# Patient Record
Sex: MALE | Race: WHITE | NOT HISPANIC OR LATINO
[De-identification: names, ages, dates, MRNs, and addresses within clinical notes are randomized per-mention and may not be internally consistent; named-entity substitution may affect disease eponyms.]

---

## 2017-05-09 PROBLEM — Z00.00 ENCOUNTER FOR PREVENTIVE HEALTH EXAMINATION: Status: ACTIVE | Noted: 2017-05-09

## 2017-05-30 ENCOUNTER — FORM ENCOUNTER (OUTPATIENT)
Age: 62
End: 2017-05-30

## 2017-05-31 ENCOUNTER — OUTPATIENT (OUTPATIENT)
Dept: OUTPATIENT SERVICES | Facility: HOSPITAL | Age: 62
LOS: 1 days | End: 2017-05-31
Payer: COMMERCIAL

## 2017-05-31 ENCOUNTER — APPOINTMENT (OUTPATIENT)
Dept: SPINE | Facility: CLINIC | Age: 62
End: 2017-05-31

## 2017-05-31 VITALS
BODY MASS INDEX: 33.6 KG/M2 | SYSTOLIC BLOOD PRESSURE: 113 MMHG | HEART RATE: 67 BPM | WEIGHT: 240 LBS | HEIGHT: 71 IN | OXYGEN SATURATION: 97 % | DIASTOLIC BLOOD PRESSURE: 73 MMHG

## 2017-05-31 PROCEDURE — 72050 X-RAY EXAM NECK SPINE 4/5VWS: CPT

## 2017-05-31 PROCEDURE — 72050 X-RAY EXAM NECK SPINE 4/5VWS: CPT | Mod: 26

## 2017-06-13 ENCOUNTER — FORM ENCOUNTER (OUTPATIENT)
Age: 62
End: 2017-06-13

## 2017-06-14 ENCOUNTER — OUTPATIENT (OUTPATIENT)
Dept: OUTPATIENT SERVICES | Facility: HOSPITAL | Age: 62
LOS: 1 days | End: 2017-06-14
Payer: COMMERCIAL

## 2017-06-14 ENCOUNTER — APPOINTMENT (OUTPATIENT)
Dept: SPINE | Facility: CLINIC | Age: 62
End: 2017-06-14

## 2017-06-14 VITALS
DIASTOLIC BLOOD PRESSURE: 77 MMHG | HEART RATE: 73 BPM | WEIGHT: 240 LBS | SYSTOLIC BLOOD PRESSURE: 123 MMHG | BODY MASS INDEX: 33.6 KG/M2 | OXYGEN SATURATION: 99 % | HEIGHT: 71 IN

## 2017-06-14 PROCEDURE — 72125 CT NECK SPINE W/O DYE: CPT

## 2017-06-14 PROCEDURE — 72125 CT NECK SPINE W/O DYE: CPT | Mod: 26

## 2017-07-12 ENCOUNTER — MOBILE ON CALL (OUTPATIENT)
Age: 62
End: 2017-07-12

## 2017-08-31 VITALS
TEMPERATURE: 98 F | HEART RATE: 63 BPM | RESPIRATION RATE: 18 BRPM | HEIGHT: 70 IN | DIASTOLIC BLOOD PRESSURE: 63 MMHG | WEIGHT: 229.94 LBS | SYSTOLIC BLOOD PRESSURE: 136 MMHG | OXYGEN SATURATION: 98 %

## 2017-08-31 NOTE — PATIENT PROFILE ADULT. - PMH
Depression    HLD (hyperlipidemia)    HTN (hypertension)    Spinal cord compression  cervical Depression    DM (diabetes mellitus)    DVT (deep venous thrombosis)  left leg  HLD (hyperlipidemia)    HTN (hypertension)    MI (myocardial infarction)    Spinal cord compression  cervical

## 2017-08-31 NOTE — PATIENT PROFILE ADULT. - PSH
S/P cholecystectomy    Surgery, elective  sinus surgery  Surgery, elective  lung removal, partial  Surgery, elective  ankle arthroscopy with open repair History of knee replacement, total, left    S/P cholecystectomy    Surgery, elective  ankle arthroscopy with open repair  Surgery, elective  lung removal, partial  Surgery, elective  sinus surgery

## 2017-09-01 ENCOUNTER — INPATIENT (INPATIENT)
Facility: HOSPITAL | Age: 62
LOS: 4 days | Discharge: NURSING, REHAB OR LONG-TERM CARE | DRG: 472 | End: 2017-09-06
Attending: NEUROLOGICAL SURGERY | Admitting: NEUROLOGICAL SURGERY
Payer: COMMERCIAL

## 2017-09-01 DIAGNOSIS — Z90.49 ACQUIRED ABSENCE OF OTHER SPECIFIED PARTS OF DIGESTIVE TRACT: Chronic | ICD-10-CM

## 2017-09-01 DIAGNOSIS — Z41.9 ENCOUNTER FOR PROCEDURE FOR PURPOSES OTHER THAN REMEDYING HEALTH STATE, UNSPECIFIED: Chronic | ICD-10-CM

## 2017-09-01 DIAGNOSIS — Z90.2 ACQUIRED ABSENCE OF LUNG [PART OF]: ICD-10-CM

## 2017-09-01 DIAGNOSIS — Z96.652 PRESENCE OF LEFT ARTIFICIAL KNEE JOINT: Chronic | ICD-10-CM

## 2017-09-01 DIAGNOSIS — G95.29 OTHER CORD COMPRESSION: ICD-10-CM

## 2017-09-01 LAB
BASE EXCESS BLDA CALC-SCNC: -2.5 MMOL/L — LOW (ref -2–3)
CA-I BLDA-SCNC: 1.03 MMOL/L — LOW (ref 1.12–1.3)
COHGB MFR BLDA: 3 % — HIGH
GAS PNL BLDA: SIGNIFICANT CHANGE UP
HCO3 BLDA-SCNC: 22 MMOL/L — SIGNIFICANT CHANGE UP (ref 21–28)
HGB BLDA-MCNC: 12.7 G/DL — LOW (ref 13–17)
METHGB MFR BLDA: 0.2 % — SIGNIFICANT CHANGE UP
O2 CT VFR BLDA CALC: 17.3 ML/DL — SIGNIFICANT CHANGE UP (ref 15–23)
OXYHGB MFR BLDA: 96 % — SIGNIFICANT CHANGE UP (ref 94–100)
PCO2 BLDA: 38 MMHG — SIGNIFICANT CHANGE UP (ref 35–48)
PH BLDA: 7.39 — SIGNIFICANT CHANGE UP (ref 7.35–7.45)
PO2 BLDA: 132 MMHG — HIGH (ref 83–108)
POTASSIUM BLDA-SCNC: 4 MMOL/L — SIGNIFICANT CHANGE UP (ref 3.5–4.9)
SAO2 % BLDA: 99 % — SIGNIFICANT CHANGE UP (ref 95–100)
SODIUM BLDA-SCNC: 137 MMOL/L — LOW (ref 138–146)

## 2017-09-01 PROCEDURE — 22842 INSERT SPINE FIXATION DEVICE: CPT

## 2017-09-01 PROCEDURE — 63048 LAM FACETEC &FORAMOT EA ADDL: CPT

## 2017-09-01 PROCEDURE — 63045 LAM FACETEC & FORAMOT CRV: CPT | Mod: 80

## 2017-09-01 PROCEDURE — 22614 ARTHRD PST TQ 1NTRSPC EA ADD: CPT

## 2017-09-01 PROCEDURE — 22600 ARTHRD PST TQ 1NTRSPC CRV: CPT | Mod: 80

## 2017-09-01 PROCEDURE — 22600 ARTHRD PST TQ 1NTRSPC CRV: CPT

## 2017-09-01 PROCEDURE — 22842 INSERT SPINE FIXATION DEVICE: CPT | Mod: 80

## 2017-09-01 PROCEDURE — 22614 ARTHRD PST TQ 1NTRSPC EA ADD: CPT | Mod: 80

## 2017-09-01 PROCEDURE — 63048 LAM FACETEC &FORAMOT EA ADDL: CPT | Mod: 80

## 2017-09-01 PROCEDURE — 63045 LAM FACETEC & FORAMOT CRV: CPT

## 2017-09-01 RX ORDER — DEXTROSE 50 % IN WATER 50 %
25 SYRINGE (ML) INTRAVENOUS ONCE
Qty: 0 | Refills: 0 | Status: DISCONTINUED | OUTPATIENT
Start: 2017-09-01 | End: 2017-09-06

## 2017-09-01 RX ORDER — ATORVASTATIN CALCIUM 80 MG/1
20 TABLET, FILM COATED ORAL AT BEDTIME
Qty: 0 | Refills: 0 | Status: DISCONTINUED | OUTPATIENT
Start: 2017-09-01 | End: 2017-09-06

## 2017-09-01 RX ORDER — OXYCODONE AND ACETAMINOPHEN 5; 325 MG/1; MG/1
1 TABLET ORAL EVERY 4 HOURS
Qty: 0 | Refills: 0 | Status: DISCONTINUED | OUTPATIENT
Start: 2017-09-01 | End: 2017-09-06

## 2017-09-01 RX ORDER — SODIUM CHLORIDE 9 MG/ML
1000 INJECTION INTRAMUSCULAR; INTRAVENOUS; SUBCUTANEOUS
Qty: 0 | Refills: 0 | Status: DISCONTINUED | OUTPATIENT
Start: 2017-09-01 | End: 2017-09-02

## 2017-09-01 RX ORDER — INSULIN LISPRO 100/ML
VIAL (ML) SUBCUTANEOUS
Qty: 0 | Refills: 0 | Status: DISCONTINUED | OUTPATIENT
Start: 2017-09-01 | End: 2017-09-06

## 2017-09-01 RX ORDER — BUPIVACAINE 13.3 MG/ML
20 INJECTION, SUSPENSION, LIPOSOMAL INFILTRATION ONCE
Qty: 0 | Refills: 0 | Status: DISCONTINUED | OUTPATIENT
Start: 2017-09-01 | End: 2017-09-06

## 2017-09-01 RX ORDER — CYCLOBENZAPRINE HYDROCHLORIDE 10 MG/1
10 TABLET, FILM COATED ORAL EVERY 8 HOURS
Qty: 0 | Refills: 0 | Status: DISCONTINUED | OUTPATIENT
Start: 2017-09-01 | End: 2017-09-05

## 2017-09-01 RX ORDER — GLUCAGON INJECTION, SOLUTION 0.5 MG/.1ML
1 INJECTION, SOLUTION SUBCUTANEOUS ONCE
Qty: 0 | Refills: 0 | Status: DISCONTINUED | OUTPATIENT
Start: 2017-09-01 | End: 2017-09-06

## 2017-09-01 RX ORDER — SODIUM CHLORIDE 9 MG/ML
1000 INJECTION, SOLUTION INTRAVENOUS
Qty: 0 | Refills: 0 | Status: DISCONTINUED | OUTPATIENT
Start: 2017-09-01 | End: 2017-09-06

## 2017-09-01 RX ORDER — DEXTROSE 50 % IN WATER 50 %
1 SYRINGE (ML) INTRAVENOUS ONCE
Qty: 0 | Refills: 0 | Status: DISCONTINUED | OUTPATIENT
Start: 2017-09-01 | End: 2017-09-06

## 2017-09-01 RX ORDER — DOCUSATE SODIUM 100 MG
100 CAPSULE ORAL THREE TIMES A DAY
Qty: 0 | Refills: 0 | Status: DISCONTINUED | OUTPATIENT
Start: 2017-09-01 | End: 2017-09-06

## 2017-09-01 RX ORDER — PROPRANOLOL HCL 160 MG
20 CAPSULE, EXTENDED RELEASE 24HR ORAL DAILY
Qty: 0 | Refills: 0 | Status: DISCONTINUED | OUTPATIENT
Start: 2017-09-01 | End: 2017-09-06

## 2017-09-01 RX ORDER — INSULIN LISPRO 100/ML
VIAL (ML) SUBCUTANEOUS AT BEDTIME
Qty: 0 | Refills: 0 | Status: DISCONTINUED | OUTPATIENT
Start: 2017-09-01 | End: 2017-09-03

## 2017-09-01 RX ORDER — ACETAMINOPHEN 500 MG
650 TABLET ORAL EVERY 6 HOURS
Qty: 0 | Refills: 0 | Status: DISCONTINUED | OUTPATIENT
Start: 2017-09-01 | End: 2017-09-06

## 2017-09-01 RX ORDER — CLONAZEPAM 1 MG
3 TABLET ORAL AT BEDTIME
Qty: 0 | Refills: 0 | Status: DISCONTINUED | OUTPATIENT
Start: 2017-09-01 | End: 2017-09-06

## 2017-09-01 RX ORDER — HYDROMORPHONE HYDROCHLORIDE 2 MG/ML
1 INJECTION INTRAMUSCULAR; INTRAVENOUS; SUBCUTANEOUS
Qty: 0 | Refills: 0 | Status: DISCONTINUED | OUTPATIENT
Start: 2017-09-01 | End: 2017-09-02

## 2017-09-01 RX ORDER — RISPERIDONE 4 MG/1
3 TABLET ORAL AT BEDTIME
Qty: 0 | Refills: 0 | Status: DISCONTINUED | OUTPATIENT
Start: 2017-09-01 | End: 2017-09-06

## 2017-09-01 RX ORDER — DULOXETINE HYDROCHLORIDE 30 MG/1
60 CAPSULE, DELAYED RELEASE ORAL DAILY
Qty: 0 | Refills: 0 | Status: DISCONTINUED | OUTPATIENT
Start: 2017-09-01 | End: 2017-09-06

## 2017-09-01 RX ORDER — MAGNESIUM HYDROXIDE 400 MG/1
30 TABLET, CHEWABLE ORAL EVERY 12 HOURS
Qty: 0 | Refills: 0 | Status: DISCONTINUED | OUTPATIENT
Start: 2017-09-01 | End: 2017-09-06

## 2017-09-01 RX ORDER — ZALEPLON 10 MG
20 CAPSULE ORAL AT BEDTIME
Qty: 0 | Refills: 0 | Status: DISCONTINUED | OUTPATIENT
Start: 2017-09-01 | End: 2017-09-06

## 2017-09-01 RX ORDER — TAMSULOSIN HYDROCHLORIDE 0.4 MG/1
0.4 CAPSULE ORAL AT BEDTIME
Qty: 0 | Refills: 0 | Status: DISCONTINUED | OUTPATIENT
Start: 2017-09-01 | End: 2017-09-06

## 2017-09-01 RX ORDER — OXYCODONE AND ACETAMINOPHEN 5; 325 MG/1; MG/1
2 TABLET ORAL EVERY 6 HOURS
Qty: 0 | Refills: 0 | Status: DISCONTINUED | OUTPATIENT
Start: 2017-09-01 | End: 2017-09-06

## 2017-09-01 RX ORDER — ONDANSETRON 8 MG/1
4 TABLET, FILM COATED ORAL EVERY 6 HOURS
Qty: 0 | Refills: 0 | Status: DISCONTINUED | OUTPATIENT
Start: 2017-09-01 | End: 2017-09-06

## 2017-09-01 RX ORDER — HYDROMORPHONE HYDROCHLORIDE 2 MG/ML
1 INJECTION INTRAMUSCULAR; INTRAVENOUS; SUBCUTANEOUS
Qty: 0 | Refills: 0 | Status: DISCONTINUED | OUTPATIENT
Start: 2017-09-01 | End: 2017-09-05

## 2017-09-01 RX ORDER — CEFAZOLIN SODIUM 1 G
2000 VIAL (EA) INJECTION EVERY 8 HOURS
Qty: 0 | Refills: 0 | Status: COMPLETED | OUTPATIENT
Start: 2017-09-01 | End: 2017-09-02

## 2017-09-01 RX ORDER — DEXTROSE 50 % IN WATER 50 %
12.5 SYRINGE (ML) INTRAVENOUS ONCE
Qty: 0 | Refills: 0 | Status: DISCONTINUED | OUTPATIENT
Start: 2017-09-01 | End: 2017-09-06

## 2017-09-01 RX ORDER — DEXAMETHASONE 0.5 MG/5ML
4 ELIXIR ORAL EVERY 8 HOURS
Qty: 0 | Refills: 0 | Status: DISCONTINUED | OUTPATIENT
Start: 2017-09-01 | End: 2017-09-02

## 2017-09-01 RX ORDER — SENNA PLUS 8.6 MG/1
2 TABLET ORAL AT BEDTIME
Qty: 0 | Refills: 0 | Status: DISCONTINUED | OUTPATIENT
Start: 2017-09-01 | End: 2017-09-06

## 2017-09-01 RX ORDER — BETHANECHOL CHLORIDE 25 MG
25 TABLET ORAL
Qty: 0 | Refills: 0 | Status: DISCONTINUED | OUTPATIENT
Start: 2017-09-01 | End: 2017-09-06

## 2017-09-01 RX ADMIN — Medication 3 MILLIGRAM(S): at 22:15

## 2017-09-01 RX ADMIN — HYDROMORPHONE HYDROCHLORIDE 1 MILLIGRAM(S): 2 INJECTION INTRAMUSCULAR; INTRAVENOUS; SUBCUTANEOUS at 17:40

## 2017-09-01 RX ADMIN — Medication 1: at 18:36

## 2017-09-01 RX ADMIN — HYDROMORPHONE HYDROCHLORIDE 1 MILLIGRAM(S): 2 INJECTION INTRAMUSCULAR; INTRAVENOUS; SUBCUTANEOUS at 17:25

## 2017-09-01 RX ADMIN — Medication 100 MILLIGRAM(S): at 16:58

## 2017-09-01 RX ADMIN — SENNA PLUS 2 TABLET(S): 8.6 TABLET ORAL at 22:15

## 2017-09-01 RX ADMIN — ATORVASTATIN CALCIUM 20 MILLIGRAM(S): 80 TABLET, FILM COATED ORAL at 22:15

## 2017-09-01 RX ADMIN — RISPERIDONE 3 MILLIGRAM(S): 4 TABLET ORAL at 22:15

## 2017-09-01 RX ADMIN — OXYCODONE AND ACETAMINOPHEN 2 TABLET(S): 5; 325 TABLET ORAL at 21:50

## 2017-09-01 RX ADMIN — SODIUM CHLORIDE 80 MILLILITER(S): 9 INJECTION INTRAMUSCULAR; INTRAVENOUS; SUBCUTANEOUS at 16:49

## 2017-09-01 RX ADMIN — HYDROMORPHONE HYDROCHLORIDE 1 MILLIGRAM(S): 2 INJECTION INTRAMUSCULAR; INTRAVENOUS; SUBCUTANEOUS at 15:40

## 2017-09-01 RX ADMIN — HYDROMORPHONE HYDROCHLORIDE 1 MILLIGRAM(S): 2 INJECTION INTRAMUSCULAR; INTRAVENOUS; SUBCUTANEOUS at 14:23

## 2017-09-01 RX ADMIN — Medication 25 MILLIGRAM(S): at 18:36

## 2017-09-01 RX ADMIN — TAMSULOSIN HYDROCHLORIDE 0.4 MILLIGRAM(S): 0.4 CAPSULE ORAL at 22:14

## 2017-09-01 RX ADMIN — Medication 4 MILLIGRAM(S): at 14:23

## 2017-09-01 RX ADMIN — OXYCODONE AND ACETAMINOPHEN 2 TABLET(S): 5; 325 TABLET ORAL at 21:02

## 2017-09-01 RX ADMIN — HYDROMORPHONE HYDROCHLORIDE 1 MILLIGRAM(S): 2 INJECTION INTRAMUSCULAR; INTRAVENOUS; SUBCUTANEOUS at 23:35

## 2017-09-01 RX ADMIN — HYDROMORPHONE HYDROCHLORIDE 1 MILLIGRAM(S): 2 INJECTION INTRAMUSCULAR; INTRAVENOUS; SUBCUTANEOUS at 23:06

## 2017-09-01 RX ADMIN — Medication 4 MILLIGRAM(S): at 23:04

## 2017-09-01 RX ADMIN — Medication 100 MILLIGRAM(S): at 22:15

## 2017-09-01 NOTE — H&P PST ADULT - PSH
History of knee replacement, total, left    S/P cholecystectomy    Surgery, elective  ankle arthroscopy with open repair  Surgery, elective  lung removal, partial  Surgery, elective  sinus surgery

## 2017-09-01 NOTE — H&P PST ADULT - ASSESSMENT
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  Presents with unsteady gait and MRI showing severe spinal cord compression at C4-C5.    1)  Consent signed by patient and attending in chart  2)  Pre-op medical clearance in chart  3)  Home meds: will continue psych meds, continue HLD and HTN meds.  Will do   4)  Plan for regional admission post op  5)  Mechanical DVT prophylaxis  6)  Discussed with Dr. Dallas

## 2017-09-01 NOTE — PROGRESS NOTE ADULT - SUBJECTIVE AND OBJECTIVE BOX
Subjective: Neurosurgery POC  POD#0 C2-C7 laminectomies, C3-C6 fusion. No intra op complications, 200 mls of EBL.  Patient is on PACU still drowsy from anesthesia, easily arousable  responds proper to verbal commands.  Admits to  mild posterior neck incisional pain. Denies weakness and sensory deficit.        T(C): 36.2 (09-01-17 @ 14:37), Max: 36.2 (09-01-17 @ 14:37)  HR: 68 (09-01-17 @ 14:37) (68 - 74)  BP: 145/70 (09-01-17 @ 14:37) (117/70 - 156/83)  RR: 10 (09-01-17 @ 14:37) (7 - 11)  SpO2: 97% (09-01-17 @ 14:37) (93% - 100%)  Wt(kg): --    Exam:  A&O x3 NAD, PERRL  Neck: posterior dressing is dry and clean. no edema, no hematoma  No tracheodeviation.  Lung: clear to auscultation  EXT: No focal motor deficit, 5/5 x 4  No sensory deficit to touch.     Wound: dry and clean as above  Hemovac with minimal bloody drainage    Imaging: none    Assessment/Plan:62 male post C2-C7 laminectomies, 3-C6 fusion, doing well  Plan: Pain control -  PCA when patient is more awake - Dr. Fuentes to follow  PT/OT for ADSL  monitor hemovac output  Dr. Alvarez consult for medical issus.  D/W Celena Dobbs.

## 2017-09-01 NOTE — BRIEF OPERATIVE NOTE - PROCEDURE
Cervical laminectomy with spinal fusion  09/01/2017  Laminectomy C2-C3, C4-C5, C5-C6, C6-C7  FUSION C3-C4, C4-C5, C5-C6  Active  ABEDI2

## 2017-09-01 NOTE — H&P PST ADULT - ADDITIONAL PE
"The patient appears to have a slow mentation. He has a rather masked faces. He does not  show much emotion. He speaks in a very slow manner. Memory does appear to be  impaired. Apparently, he was not able to state his age correctly. His gait is also extremely  slow, a bit unsteady. He tends to scissor. Individual muscle group testing in the upper and lower extremities does not disclose any focal weakness. He does appear to left leg circumvert a bit more than the right. There are no long track signs. "

## 2017-09-01 NOTE — H&P PST ADULT - HISTORY OF PRESENT ILLNESS
This is a 61 y/o male who was seen twice by Dr. Dallas in the past 6 months.  The following is taken from his initial office visit:    " he has had a history of an  injury approximately 20 years ago when he fell from a ladder. He has been disabled. At that time, he had a head injury, which caused significant personality changes and behavioral disturbance, and he has been on disability ever since. Recently, however, he has been having a new constellation of speech disturbance and ataxia and memory loss.  He had an MRI of his brain and the cervical spine. The MRI of the brain does not show  hydrocephalus per say. The MRI does show diffuse white matter changes bilaterally in the periventricular region, however. The MRI of the cervical spine does show significant compression at the C4-5 level. For some  reason, the actual transaxial views at that level are not particularly clear but there is no questionthere is significant spinal compression and stenosis at that level"    The patient was brought back in June 2017 for review of CT scan, which showed large bony mass with left spinal cord compression.  The case was reviewed in our weekly spine conference and he was brought in electively for decompression and posterior fusion.

## 2017-09-01 NOTE — H&P PST ADULT - PMH
Depression    DM (diabetes mellitus)    DVT (deep venous thrombosis)  left leg  HLD (hyperlipidemia)    HTN (hypertension)    MI (myocardial infarction)    Spinal cord compression  cervical

## 2017-09-02 ENCOUNTER — TRANSCRIPTION ENCOUNTER (OUTPATIENT)
Age: 62
End: 2017-09-02

## 2017-09-02 DIAGNOSIS — E11.9 TYPE 2 DIABETES MELLITUS WITHOUT COMPLICATIONS: ICD-10-CM

## 2017-09-02 DIAGNOSIS — G95.20 UNSPECIFIED CORD COMPRESSION: ICD-10-CM

## 2017-09-02 DIAGNOSIS — I10 ESSENTIAL (PRIMARY) HYPERTENSION: ICD-10-CM

## 2017-09-02 DIAGNOSIS — E78.5 HYPERLIPIDEMIA, UNSPECIFIED: ICD-10-CM

## 2017-09-02 DIAGNOSIS — I82.409 ACUTE EMBOLISM AND THROMBOSIS OF UNSPECIFIED DEEP VEINS OF UNSPECIFIED LOWER EXTREMITY: ICD-10-CM

## 2017-09-02 LAB
ANION GAP SERPL CALC-SCNC: 7 MMOL/L — SIGNIFICANT CHANGE UP (ref 5–17)
BASOPHILS NFR BLD AUTO: 0 % — SIGNIFICANT CHANGE UP (ref 0–2)
BUN SERPL-MCNC: 15 MG/DL — SIGNIFICANT CHANGE UP (ref 7–23)
CALCIUM SERPL-MCNC: 8.2 MG/DL — LOW (ref 8.4–10.5)
CHLORIDE SERPL-SCNC: 99 MMOL/L — SIGNIFICANT CHANGE UP (ref 96–108)
CO2 SERPL-SCNC: 28 MMOL/L — SIGNIFICANT CHANGE UP (ref 22–31)
CREAT SERPL-MCNC: 1 MG/DL — SIGNIFICANT CHANGE UP (ref 0.5–1.3)
EOSINOPHIL NFR BLD AUTO: 0 % — SIGNIFICANT CHANGE UP (ref 0–6)
EXTRA BLUE TOP TUBE: SIGNIFICANT CHANGE UP
GLUCOSE SERPL-MCNC: 170 MG/DL — HIGH (ref 70–99)
HBA1C BLD-MCNC: 7.1 % — HIGH (ref 4–5.6)
HCT VFR BLD CALC: 38.8 % — LOW (ref 39–50)
HGB BLD-MCNC: 13.3 G/DL — SIGNIFICANT CHANGE UP (ref 13–17)
LYMPHOCYTES # BLD AUTO: 4.6 % — LOW (ref 13–44)
MCHC RBC-ENTMCNC: 30.9 PG — SIGNIFICANT CHANGE UP (ref 27–34)
MCHC RBC-ENTMCNC: 34.3 G/DL — SIGNIFICANT CHANGE UP (ref 32–36)
MCV RBC AUTO: 90.2 FL — SIGNIFICANT CHANGE UP (ref 80–100)
MONOCYTES NFR BLD AUTO: 2.4 % — SIGNIFICANT CHANGE UP (ref 2–14)
NEUTROPHILS NFR BLD AUTO: 93 % — HIGH (ref 43–77)
PLATELET # BLD AUTO: 232 K/UL — SIGNIFICANT CHANGE UP (ref 150–400)
POTASSIUM SERPL-MCNC: 4.7 MMOL/L — SIGNIFICANT CHANGE UP (ref 3.5–5.3)
POTASSIUM SERPL-SCNC: 4.7 MMOL/L — SIGNIFICANT CHANGE UP (ref 3.5–5.3)
RBC # BLD: 4.3 M/UL — SIGNIFICANT CHANGE UP (ref 4.2–5.8)
RBC # FLD: 12.6 % — SIGNIFICANT CHANGE UP (ref 10.3–16.9)
SODIUM SERPL-SCNC: 134 MMOL/L — LOW (ref 135–145)
WBC # BLD: 18.3 K/UL — HIGH (ref 3.8–10.5)
WBC # FLD AUTO: 18.3 K/UL — HIGH (ref 3.8–10.5)

## 2017-09-02 PROCEDURE — 99223 1ST HOSP IP/OBS HIGH 75: CPT | Mod: GC

## 2017-09-02 RX ORDER — PANTOPRAZOLE SODIUM 20 MG/1
40 TABLET, DELAYED RELEASE ORAL
Qty: 0 | Refills: 0 | Status: DISCONTINUED | OUTPATIENT
Start: 2017-09-02 | End: 2017-09-06

## 2017-09-02 RX ORDER — ENOXAPARIN SODIUM 100 MG/ML
40 INJECTION SUBCUTANEOUS AT BEDTIME
Qty: 0 | Refills: 0 | Status: DISCONTINUED | OUTPATIENT
Start: 2017-09-02 | End: 2017-09-06

## 2017-09-02 RX ORDER — DEXAMETHASONE 0.5 MG/5ML
ELIXIR ORAL
Qty: 0 | Refills: 0 | Status: COMPLETED | OUTPATIENT
Start: 2017-09-02 | End: 2017-09-04

## 2017-09-02 RX ORDER — DEXAMETHASONE 0.5 MG/5ML
4 ELIXIR ORAL EVERY 6 HOURS
Qty: 0 | Refills: 0 | Status: COMPLETED | OUTPATIENT
Start: 2017-09-02 | End: 2017-09-02

## 2017-09-02 RX ORDER — DEXAMETHASONE 0.5 MG/5ML
2 ELIXIR ORAL EVERY 12 HOURS
Qty: 0 | Refills: 0 | Status: COMPLETED | OUTPATIENT
Start: 2017-09-04 | End: 2017-09-04

## 2017-09-02 RX ORDER — DEXAMETHASONE 0.5 MG/5ML
2 ELIXIR ORAL EVERY 6 HOURS
Qty: 0 | Refills: 0 | Status: COMPLETED | OUTPATIENT
Start: 2017-09-03 | End: 2017-09-03

## 2017-09-02 RX ADMIN — PANTOPRAZOLE SODIUM 40 MILLIGRAM(S): 20 TABLET, DELAYED RELEASE ORAL at 11:56

## 2017-09-02 RX ADMIN — Medication 20 MILLIGRAM(S): at 06:13

## 2017-09-02 RX ADMIN — Medication 4 MILLIGRAM(S): at 06:13

## 2017-09-02 RX ADMIN — Medication 2 MILLIGRAM(S): at 23:09

## 2017-09-02 RX ADMIN — ATORVASTATIN CALCIUM 20 MILLIGRAM(S): 80 TABLET, FILM COATED ORAL at 21:48

## 2017-09-02 RX ADMIN — Medication 25 MILLIGRAM(S): at 07:00

## 2017-09-02 RX ADMIN — Medication 3 MILLIGRAM(S): at 21:49

## 2017-09-02 RX ADMIN — RISPERIDONE 3 MILLIGRAM(S): 4 TABLET ORAL at 21:48

## 2017-09-02 RX ADMIN — OXYCODONE AND ACETAMINOPHEN 2 TABLET(S): 5; 325 TABLET ORAL at 23:40

## 2017-09-02 RX ADMIN — SODIUM CHLORIDE 80 MILLILITER(S): 9 INJECTION INTRAMUSCULAR; INTRAVENOUS; SUBCUTANEOUS at 06:14

## 2017-09-02 RX ADMIN — Medication 100 MILLIGRAM(S): at 06:13

## 2017-09-02 RX ADMIN — Medication 100 MILLIGRAM(S): at 14:42

## 2017-09-02 RX ADMIN — OXYCODONE AND ACETAMINOPHEN 2 TABLET(S): 5; 325 TABLET ORAL at 15:42

## 2017-09-02 RX ADMIN — OXYCODONE AND ACETAMINOPHEN 2 TABLET(S): 5; 325 TABLET ORAL at 23:09

## 2017-09-02 RX ADMIN — OXYCODONE AND ACETAMINOPHEN 2 TABLET(S): 5; 325 TABLET ORAL at 14:42

## 2017-09-02 RX ADMIN — Medication 2: at 11:57

## 2017-09-02 RX ADMIN — OXYCODONE AND ACETAMINOPHEN 2 TABLET(S): 5; 325 TABLET ORAL at 07:00

## 2017-09-02 RX ADMIN — TAMSULOSIN HYDROCHLORIDE 0.4 MILLIGRAM(S): 0.4 CAPSULE ORAL at 21:48

## 2017-09-02 RX ADMIN — Medication 30 MILLIGRAM(S): at 11:57

## 2017-09-02 RX ADMIN — Medication 25 MILLIGRAM(S): at 18:47

## 2017-09-02 RX ADMIN — OXYCODONE AND ACETAMINOPHEN 2 TABLET(S): 5; 325 TABLET ORAL at 06:13

## 2017-09-02 RX ADMIN — DULOXETINE HYDROCHLORIDE 60 MILLIGRAM(S): 30 CAPSULE, DELAYED RELEASE ORAL at 11:56

## 2017-09-02 RX ADMIN — Medication 4 MILLIGRAM(S): at 14:42

## 2017-09-02 RX ADMIN — Medication 100 MILLIGRAM(S): at 00:47

## 2017-09-02 RX ADMIN — ENOXAPARIN SODIUM 40 MILLIGRAM(S): 100 INJECTION SUBCUTANEOUS at 21:49

## 2017-09-02 RX ADMIN — Medication 1: at 16:53

## 2017-09-02 NOTE — PHYSICAL THERAPY INITIAL EVALUATION ADULT - ADDITIONAL COMMENTS
Patient lives in a private house in the [place] with 6-7 steps (with handrail) to enter. Patient also with one flight of ~12 steps (with handrail) to reach second floor where bedrooms are. Has a basement but does not need to access it. Prior to admission, patient was independent for all functional mobility and used a cane for ambulation. Endorses 3 falls in past few months, one in which he had syncopal episode, one in which his leg gave out, and one in which he tripped and fell.

## 2017-09-02 NOTE — PHYSICAL THERAPY INITIAL EVALUATION ADULT - PLANNED THERAPY INTERVENTIONS, PT EVAL
balance training/transfer training/gait training/bed mobility training/postural re-education/strengthening

## 2017-09-02 NOTE — PHYSICAL THERAPY INITIAL EVALUATION ADULT - CRITERIA FOR SKILLED THERAPEUTIC INTERVENTIONS
rehab potential/anticipated discharge recommendation/impairments found/functional limitations in following categories/predicted duration of therapy intervention/therapy frequency/risk reduction/prevention

## 2017-09-02 NOTE — CONSULT NOTE ADULT - PROBLEM SELECTOR RECOMMENDATION 9
patient had a history of DVT after his TKR.  He is high risk for recurrence and will continue current regimen and will follow on venous doppler prior dc

## 2017-09-02 NOTE — PROGRESS NOTE ADULT - SUBJECTIVE AND OBJECTIVE BOX
HPI:  This is a 61 y/o male who was seen twice by Dr. Dallas in the past 6 months.  The following is taken from his initial office visit:    " he has had a history of an  injury approximately 20 years ago when he fell from a ladder. He has been disabled. At that time, he had a head injury, which caused significant personality changes and behavioral disturbance, and he has been on disability ever since. Recently, however, he has been having a new constellation of speech disturbance and ataxia and memory loss.  He had an MRI of his brain and the cervical spine. The MRI of the brain does not show  hydrocephalus per say. The MRI does show diffuse white matter changes bilaterally in the periventricular region, however. The MRI of the cervical spine does show significant compression at the C4-5 level. For some  reason, the actual transaxial views at that level are not particularly clear but there is no questionthere is significant spinal compression and stenosis at that level"    The patient was brought back in June 2017 for review of CT scan, which showed large bony mass with left spinal cord compression.  The case was reviewed in our weekly spine conference and he was brought in electively for decompression and posterior fusion. (01 Sep 2017 09:39)    Hospital course:  POD#1: Hemovac output 135cc overnight. Neurologically stable/intact. Pain well controlled with PO/IV PRN management. Tolerating PO diet. Pending PT/OT evaluation.    Vital Signs Last 24 Hrs  T(C): 36 (02 Sep 2017 05:50), Max: 36.7 (01 Sep 2017 15:40)  T(F): 96.8 (02 Sep 2017 05:50), Max: 98 (01 Sep 2017 15:40)  HR: 101 (02 Sep 2017 05:50) (68 - 101)  BP: 137/74 (02 Sep 2017 05:50) (117/70 - 156/83)  BP(mean): 104 (01 Sep 2017 14:37) (88 - 111)  RR: 16 (02 Sep 2017 05:50) (7 - 19)  SpO2: 95% (02 Sep 2017 05:50) (93% - 100%)    I&O's Summary    01 Sep 2017 07:01  -  02 Sep 2017 07:00  --------------------------------------------------------  IN: 1620 mL / OUT: 4545 mL / NET: -2925 mL        PHYSICAL EXAM:  Gen: NAD, AAOx3  HEENT: PERRL. EOMI.  Neck: Posterior incision C/D/I w/ dressing. +HMV.  Lungs: Clear b/l  Heart: S1, S2. NSR.  Abd: Soft, NT/ND. +BS  Exts: Pulses 2+ throughout  Neuro: CNs II-XII intact. 5/5 str x4 extremities. Sensation to LT intact. Following commands. Normoreflexic w/o Clonus/Hoffmans. Gait not assessed.    TUBES/LINES:  [] White  [] Lumbar Drain  [] Wound Drains  [x] Others - Hemovac      DIET:  [] NPO  [x] Mechanical  [] Tube feeds    LABS:                        13.3   18.3  )-----------( 232      ( 02 Sep 2017 06:01 )             38.8     09-02    134<L>  |  99  |  15  ----------------------------<  170<H>  4.7   |  28  |  1.00    Ca    8.2<L>      02 Sep 2017 06:01              CAPILLARY BLOOD GLUCOSE  246 (01 Sep 2017 21:30)  165 (01 Sep 2017 17:28)  139 (01 Sep 2017 12:50)  156 (01 Sep 2017 10:44)          Drug Levels: [] N/A    CSF Analysis: [] N/A      Allergies    No Known Allergies    Intolerances      MEDICATIONS:  Antibiotics:    Neuro:  clonazePAM Tablet 3 milliGRAM(s) Oral at bedtime  DULoxetine 60 milliGRAM(s) Oral daily  risperiDONE   Tablet 3 milliGRAM(s) Oral at bedtime  busPIRone 30 milliGRAM(s) Oral daily  zaleplon 20 milliGRAM(s) Oral at bedtime PRN  acetaminophen   Tablet 650 milliGRAM(s) Oral every 6 hours PRN  acetaminophen   Tablet. 650 milliGRAM(s) Oral every 6 hours PRN  oxyCODONE    5 mG/acetaminophen 325 mG 1 Tablet(s) Oral every 4 hours PRN  oxyCODONE    5 mG/acetaminophen 325 mG 2 Tablet(s) Oral every 6 hours PRN  HYDROmorphone  Injectable 1 milliGRAM(s) IV Push every 3 hours PRN  cyclobenzaprine 10 milliGRAM(s) Oral every 8 hours PRN  ondansetron Injectable 4 milliGRAM(s) IV Push every 6 hours PRN    Anticoagulation:    OTHER:  BUpivacaine liposome 1.3% Injectable (no eMAR) 20 milliLiter(s) Local Injection once  tamsulosin 0.4 milliGRAM(s) Oral at bedtime  propranolol 20 milliGRAM(s) Oral daily  atorvastatin 20 milliGRAM(s) Oral at bedtime  bethanechol 25 milliGRAM(s) Oral two times a day  docusate sodium 100 milliGRAM(s) Oral three times a day  magnesium hydroxide Suspension 30 milliLiter(s) Oral every 12 hours PRN  senna 2 Tablet(s) Oral at bedtime  insulin lispro (HumaLOG) corrective regimen sliding scale   SubCutaneous three times a day before meals  insulin lispro (HumaLOG) corrective regimen sliding scale   SubCutaneous at bedtime  dextrose Gel 1 Dose(s) Oral once PRN  dextrose 50% Injectable 12.5 Gram(s) IV Push once  dextrose 50% Injectable 25 Gram(s) IV Push once  dextrose 50% Injectable 25 Gram(s) IV Push once  glucagon  Injectable 1 milliGRAM(s) IntraMuscular once PRN  dexamethasone  Injectable 4 milliGRAM(s) IV Push every 8 hours    IVF:  sodium chloride 0.9%. 1000 milliLiter(s) IV Continuous <Continuous>  dextrose 5%. 1000 milliLiter(s) IV Continuous <Continuous>          ASSESSMENT:  62y Male with PMH of HTN, HLD, DM II, BPH, Depression now s/p Posterior cervical C2-C7 laminectomy, C3-C6 hardware fusion POD#1.      PLAN:  NEURO: Decadron taper,  Continue home anti-depression meds,  Pain meds PRN, Dr. Fuentes from pain mgmt following.  Continue hemovac to self suction    CARDIOVASCULAR: Normotensive Bp goals  AM labs reviewed, repeat labs tomorrow.    PULMONARY: Incentive spirometry, satting well on RA    RENAL: White, will consider TOV when OOB    GI: PO diet, PPI, stool softeners    HEME: SCDs, SQL to start tonight, will d/w Dr. Dallas    ID: Afebrile    ENDO: ISS    DISPOSITION: PT/OT evaluation,  Will d/w Dr. Dallas

## 2017-09-02 NOTE — CONSULT NOTE ADULT - SUBJECTIVE AND OBJECTIVE BOX
Patient is a 62y old  Male who presents with a chief complaint of Gait disturbance (01 Sep 2017 09:39)      HPI:  This is a 63 y/o male who was seen twice by Dr. Dallas in the past 6 months.  The following is taken from his initial office visit:    " he has had a history of an  injury approximately 20 years ago when he fell from a ladder. He has been disabled. At that time, he had a head injury, which caused significant personality changes and behavioral disturbance, and he has been on disability ever since. Recently, however, he has been having a new constellation of speech disturbance and ataxia and memory loss.  He had an MRI of his brain and the cervical spine. The MRI of the brain does not show  hydrocephalus per say. The MRI does show diffuse white matter changes bilaterally in the periventricular region, however. The MRI of the cervical spine does show significant compression at the C4-5 level. For some  reason, the actual transaxial views at that level are not particularly clear but there is no questionthere is significant spinal compression and stenosis at that level"    The patient was brought back in June 2017 for review of CT scan, which showed large bony mass with left spinal cord compression.  The case was reviewed in our weekly spine conference and he was brought in electively for decompression and posterior fusion. (01 Sep 2017 09:39)      PAST MEDICAL & SURGICAL HISTORY:  DVT (deep venous thrombosis): left leg  MI (myocardial infarction)  DM (diabetes mellitus)  HTN (hypertension)  HLD (hyperlipidemia)  Depression  Spinal cord compression: cervical  History of knee replacement, total, left  Surgery, elective: sinus surgery  Surgery, elective: ankle arthroscopy with open repair  Surgery, elective: lung removal, partial  S/P cholecystectomy      FAMILY HISTORY:      SOCIAL HISTORY:  Smoking Status: [ ] Current, [ ] Former, [ ] Never  Pack Years:    MEDICATIONS:  Pulmonary:    Antimicrobials:    Anticoagulants:  enoxaparin Injectable 40 milliGRAM(s) SubCutaneous at bedtime    Onc:    GI/:  bethanechol 25 milliGRAM(s) Oral two times a day  docusate sodium 100 milliGRAM(s) Oral three times a day  magnesium hydroxide Suspension 30 milliLiter(s) Oral every 12 hours PRN  senna 2 Tablet(s) Oral at bedtime  pantoprazole    Tablet 40 milliGRAM(s) Oral before breakfast    Endocrine:  atorvastatin 20 milliGRAM(s) Oral at bedtime  insulin lispro (HumaLOG) corrective regimen sliding scale   SubCutaneous three times a day before meals  insulin lispro (HumaLOG) corrective regimen sliding scale   SubCutaneous at bedtime  dextrose Gel 1 Dose(s) Oral once PRN  dextrose 50% Injectable 12.5 Gram(s) IV Push once  dextrose 50% Injectable 25 Gram(s) IV Push once  dextrose 50% Injectable 25 Gram(s) IV Push once  glucagon  Injectable 1 milliGRAM(s) IntraMuscular once PRN  dexamethasone     Tablet   Oral   dexamethasone     Tablet 4 milliGRAM(s) Oral every 6 hours    Cardiac:  tamsulosin 0.4 milliGRAM(s) Oral at bedtime  propranolol 20 milliGRAM(s) Oral daily    Other Medications:  BUpivacaine liposome 1.3% Injectable (no eMAR) 20 milliLiter(s) Local Injection once  clonazePAM Tablet 3 milliGRAM(s) Oral at bedtime  DULoxetine 60 milliGRAM(s) Oral daily  risperiDONE   Tablet 3 milliGRAM(s) Oral at bedtime  busPIRone 30 milliGRAM(s) Oral daily  zaleplon 20 milliGRAM(s) Oral at bedtime PRN  acetaminophen   Tablet 650 milliGRAM(s) Oral every 6 hours PRN  acetaminophen   Tablet. 650 milliGRAM(s) Oral every 6 hours PRN  oxyCODONE    5 mG/acetaminophen 325 mG 1 Tablet(s) Oral every 4 hours PRN  oxyCODONE    5 mG/acetaminophen 325 mG 2 Tablet(s) Oral every 6 hours PRN  HYDROmorphone  Injectable 1 milliGRAM(s) IV Push every 3 hours PRN  cyclobenzaprine 10 milliGRAM(s) Oral every 8 hours PRN  ondansetron Injectable 4 milliGRAM(s) IV Push every 6 hours PRN  dextrose 5%. 1000 milliLiter(s) IV Continuous <Continuous>      Allergies    No Known Allergies    Intolerances        Vital Signs Last 24 Hrs  T(C): 36 (02 Sep 2017 09:12), Max: 36.7 (01 Sep 2017 15:40)  T(F): 96.8 (02 Sep 2017 09:12), Max: 98 (01 Sep 2017 15:40)  HR: 100 (02 Sep 2017 09:12) (68 - 101)  BP: 116/57 (02 Sep 2017 09:12) (116/57 - 156/83)  BP(mean): 104 (01 Sep 2017 14:37) (88 - 111)  RR: 16 (02 Sep 2017 09:12) (7 - 19)  SpO2: 96% (02 Sep 2017 09:12) (93% - 100%)    09-01 @ 07:01 - 09-02 @ 07:00  --------------------------------------------------------  IN: 1620 mL / OUT: 4545 mL / NET: -2925 mL    09-02 @ 07:01 - 09-02 @ 11:50  --------------------------------------------------------  IN: 350 mL / OUT: 30 mL / NET: 320 mL          LABS:  ABG - ( 01 Sep 2017 10:42 )  pH: 7.39  /  pCO2: 38    /  pO2: 132   / HCO3: 22    / Base Excess: -2.5  /  SaO2: x                   CBC Full  -  ( 02 Sep 2017 06:01 )  WBC Count : 18.3 K/uL  Hemoglobin : 13.3 g/dL  Hematocrit : 38.8 %  Platelet Count - Automated : 232 K/uL  Mean Cell Volume : 90.2 fL  Mean Cell Hemoglobin : 30.9 pg  Mean Cell Hemoglobin Concentration : 34.3 g/dL  Auto Neutrophil # : x  Auto Lymphocyte # : x  Auto Monocyte # : x  Auto Eosinophil # : x  Auto Basophil # : x  Auto Neutrophil % : 93.0 %  Auto Lymphocyte % : 4.6 %  Auto Monocyte % : 2.4 %  Auto Eosinophil % : 0.0 %  Auto Basophil % : 0.0 %    09-02    134<L>  |  99  |  15  ----------------------------<  170<H>  4.7   |  28  |  1.00    Ca    8.2<L>      02 Sep 2017 06:01                          RADIOLOGY & ADDITIONAL STUDIES (The following images were personally reviewed):

## 2017-09-02 NOTE — PHYSICAL THERAPY INITIAL EVALUATION ADULT - DIAGNOSIS, PT EVAL
Practice Pattern 4F: Impaired joint mobility, motor function, muscle performance and reflex integrity associated with spinal disorders

## 2017-09-02 NOTE — PHYSICAL THERAPY INITIAL EVALUATION ADULT - PERTINENT HX OF CURRENT PROBLEM, REHAB EVAL
Patient is a 62 year old M who presents with complaints of chronic neck pain since an old injury ~20 years ago in which he fell off a ladder. MRI showed compression and stenosis at C4-5. Presents today for laminectomy C2-C3, C4-C5, C5-C6, C6-C7 and fusion  C3-C4, C4-C5, C5-C6

## 2017-09-02 NOTE — PHYSICAL THERAPY INITIAL EVALUATION ADULT - PATIENT/FAMILY/SIGNIFICANT OTHER GOALS STATEMENT, PT EVAL
Patient is willing and motivated to participate in physical therapy and would like to get up and walk

## 2017-09-02 NOTE — PHYSICAL THERAPY INITIAL EVALUATION ADULT - MANUAL MUSCLE TESTING RESULTS, REHAB EVAL
Strength grossly at least 3/5 based on functional assessment of bed mobility, transfers and ambulation

## 2017-09-02 NOTE — PHYSICAL THERAPY INITIAL EVALUATION ADULT - GENERAL OBSERVATIONS, REHAB EVAL
Received supine in bed with +hep lock, on room air, +SCDs, +perez catheter, in no apparent distress. Patient endorses 7/10 posterior neck pain at rest

## 2017-09-03 LAB
ANION GAP SERPL CALC-SCNC: 12 MMOL/L — SIGNIFICANT CHANGE UP (ref 5–17)
BUN SERPL-MCNC: 18 MG/DL — SIGNIFICANT CHANGE UP (ref 7–23)
CALCIUM SERPL-MCNC: 8.9 MG/DL — SIGNIFICANT CHANGE UP (ref 8.4–10.5)
CHLORIDE SERPL-SCNC: 98 MMOL/L — SIGNIFICANT CHANGE UP (ref 96–108)
CO2 SERPL-SCNC: 28 MMOL/L — SIGNIFICANT CHANGE UP (ref 22–31)
CREAT SERPL-MCNC: 1.1 MG/DL — SIGNIFICANT CHANGE UP (ref 0.5–1.3)
GLUCOSE SERPL-MCNC: 170 MG/DL — HIGH (ref 70–99)
HCT VFR BLD CALC: 37.6 % — LOW (ref 39–50)
HGB BLD-MCNC: 12.9 G/DL — LOW (ref 13–17)
MCHC RBC-ENTMCNC: 31 PG — SIGNIFICANT CHANGE UP (ref 27–34)
MCHC RBC-ENTMCNC: 34.3 G/DL — SIGNIFICANT CHANGE UP (ref 32–36)
MCV RBC AUTO: 90.4 FL — SIGNIFICANT CHANGE UP (ref 80–100)
PLATELET # BLD AUTO: 259 K/UL — SIGNIFICANT CHANGE UP (ref 150–400)
POTASSIUM SERPL-MCNC: 4.1 MMOL/L — SIGNIFICANT CHANGE UP (ref 3.5–5.3)
POTASSIUM SERPL-SCNC: 4.1 MMOL/L — SIGNIFICANT CHANGE UP (ref 3.5–5.3)
RBC # BLD: 4.16 M/UL — LOW (ref 4.2–5.8)
RBC # FLD: 12.9 % — SIGNIFICANT CHANGE UP (ref 10.3–16.9)
SODIUM SERPL-SCNC: 138 MMOL/L — SIGNIFICANT CHANGE UP (ref 135–145)
WBC # BLD: 20.1 K/UL — HIGH (ref 3.8–10.5)
WBC # FLD AUTO: 20.1 K/UL — HIGH (ref 3.8–10.5)

## 2017-09-03 RX ORDER — METFORMIN HYDROCHLORIDE 850 MG/1
1000 TABLET ORAL
Qty: 0 | Refills: 0 | Status: DISCONTINUED | OUTPATIENT
Start: 2017-09-03 | End: 2017-09-06

## 2017-09-03 RX ORDER — PANTOPRAZOLE SODIUM 20 MG/1
1 TABLET, DELAYED RELEASE ORAL
Qty: 0 | Refills: 0 | COMMUNITY
Start: 2017-09-03

## 2017-09-03 RX ORDER — DOCUSATE SODIUM 100 MG
1 CAPSULE ORAL
Qty: 42 | Refills: 0 | OUTPATIENT
Start: 2017-09-03 | End: 2017-09-17

## 2017-09-03 RX ORDER — DEXAMETHASONE 0.5 MG/5ML
1 ELIXIR ORAL
Qty: 2 | Refills: 0 | OUTPATIENT
Start: 2017-09-03 | End: 2017-09-04

## 2017-09-03 RX ORDER — DOCUSATE SODIUM 100 MG
1 CAPSULE ORAL
Qty: 0 | Refills: 0 | COMMUNITY
Start: 2017-09-03

## 2017-09-03 RX ADMIN — OXYCODONE AND ACETAMINOPHEN 2 TABLET(S): 5; 325 TABLET ORAL at 09:55

## 2017-09-03 RX ADMIN — OXYCODONE AND ACETAMINOPHEN 2 TABLET(S): 5; 325 TABLET ORAL at 18:23

## 2017-09-03 RX ADMIN — Medication 2 MILLIGRAM(S): at 17:30

## 2017-09-03 RX ADMIN — Medication 100 MILLIGRAM(S): at 13:31

## 2017-09-03 RX ADMIN — Medication 3 MILLIGRAM(S): at 21:05

## 2017-09-03 RX ADMIN — Medication 1: at 08:44

## 2017-09-03 RX ADMIN — METFORMIN HYDROCHLORIDE 1000 MILLIGRAM(S): 850 TABLET ORAL at 17:29

## 2017-09-03 RX ADMIN — Medication 2 MILLIGRAM(S): at 05:22

## 2017-09-03 RX ADMIN — Medication 100 MILLIGRAM(S): at 05:21

## 2017-09-03 RX ADMIN — Medication 2: at 11:58

## 2017-09-03 RX ADMIN — Medication 25 MILLIGRAM(S): at 06:25

## 2017-09-03 RX ADMIN — TAMSULOSIN HYDROCHLORIDE 0.4 MILLIGRAM(S): 0.4 CAPSULE ORAL at 21:05

## 2017-09-03 RX ADMIN — DULOXETINE HYDROCHLORIDE 60 MILLIGRAM(S): 30 CAPSULE, DELAYED RELEASE ORAL at 11:55

## 2017-09-03 RX ADMIN — PANTOPRAZOLE SODIUM 40 MILLIGRAM(S): 20 TABLET, DELAYED RELEASE ORAL at 06:21

## 2017-09-03 RX ADMIN — ATORVASTATIN CALCIUM 20 MILLIGRAM(S): 80 TABLET, FILM COATED ORAL at 21:05

## 2017-09-03 RX ADMIN — OXYCODONE AND ACETAMINOPHEN 2 TABLET(S): 5; 325 TABLET ORAL at 08:55

## 2017-09-03 RX ADMIN — OXYCODONE AND ACETAMINOPHEN 2 TABLET(S): 5; 325 TABLET ORAL at 17:27

## 2017-09-03 RX ADMIN — Medication 2 MILLIGRAM(S): at 11:55

## 2017-09-03 RX ADMIN — RISPERIDONE 3 MILLIGRAM(S): 4 TABLET ORAL at 21:04

## 2017-09-03 RX ADMIN — Medication 30 MILLIGRAM(S): at 11:55

## 2017-09-03 RX ADMIN — METFORMIN HYDROCHLORIDE 1000 MILLIGRAM(S): 850 TABLET ORAL at 08:44

## 2017-09-03 RX ADMIN — Medication 100 MILLIGRAM(S): at 21:05

## 2017-09-03 RX ADMIN — Medication 25 MILLIGRAM(S): at 17:31

## 2017-09-03 RX ADMIN — SENNA PLUS 2 TABLET(S): 8.6 TABLET ORAL at 21:05

## 2017-09-03 RX ADMIN — Medication 1: at 17:28

## 2017-09-03 RX ADMIN — OXYCODONE AND ACETAMINOPHEN 2 TABLET(S): 5; 325 TABLET ORAL at 23:30

## 2017-09-03 RX ADMIN — Medication 20 MILLIGRAM(S): at 05:21

## 2017-09-03 RX ADMIN — ENOXAPARIN SODIUM 40 MILLIGRAM(S): 100 INJECTION SUBCUTANEOUS at 21:04

## 2017-09-03 NOTE — DISCHARGE NOTE ADULT - PLAN OF CARE
follow up with Dr. Dallas, call to schedule an appt [phone] After leaving the hospital, please follow these instructions:  • Keep your wound clean and dry.  Watch your incision for signs of infection such as: Redness,Swelling and tenderness and Drainage.  You may shower briefly, unless you told not to by your doctor.  Cover your entire incision with a waterproof bandage to make sure it does not get wet. If it gets wet, dry it off.  No tub baths or swimming for two weeks.  Do not strain neck  Take pain medicine as prescribed.  You may find it helpful to take it for morning stiffness or for soreness when trying to  sleep.  Pain medication can cause constipation. Eating food with fiber such as fruits and  vegetables, and drinking liquids may help prevent constipation.  If you are required to wear a cervical collar, please wear it as instructed.  Call you doctor immediately if you have:  Any new numbness, tingling, or weakness in your arms and legs.  • Pain that is getting worse, or not going away after taking pain medicine.  • Fever of 101° F or more.

## 2017-09-03 NOTE — DISCHARGE NOTE ADULT - NS AS ACTIVITY OBS
Driving allowed/Walking-Outdoors allowed/No Heavy lifting/straining/Walking-Indoors allowed/Stairs allowed/Do not make important decisions/Do not drive or operate machinery/Showering allowed

## 2017-09-03 NOTE — PROGRESS NOTE ADULT - SUBJECTIVE AND OBJECTIVE BOX
Pt was examiend at the bedside, no acute events overnight, passed trial of void, pt denies sob, cp, n/v    POD#1: Hemovac output 135cc overnight. Neurologically stable/intact. Pain well controlled with PO/IV PRN management. Tolerating PO diet. Pending PT/OT evaluation.  POD#2 Hemovac output 90cc overnight.  Passed trial of void. PT eval=Home with Outpatient PT, pain is well controlled, Metformin 100oBID ( Home Med) restarted    ICU Vital Signs Last 24 Hrs  T(C): 36.1 (03 Sep 2017 05:15), Max: 36.1 (02 Sep 2017 13:39)  T(F): 96.9 (03 Sep 2017 05:15), Max: 97 (02 Sep 2017 13:39)  HR: 71 (03 Sep 2017 05:15) (71 - 100)  BP: 159/78 (03 Sep 2017 05:15) (116/57 - 159/78)  BP(mean): --  ABP: --  ABP(mean): --  RR: 16 (03 Sep 2017 05:15) (15 - 16)  SpO2: 98% (03 Sep 2017 05:15) (94% - 98%)        Exam:  AA&Ox3, NAD, coherent speech  CNs II-XII grossly intact  motor 5/5 x 4 extr,  sensation to LT grossly intact,  Back: + Hemovac, + dressing       Plan:  DC Hemovac today  PT eval =Home, with outpatient PT  SQL for DVT prophylaxis  Pain meds PRN, Percocet  Taper Decadron  SCDs, IS, Bowel Regimen, OOB  Restart Metformin 1000BID ( Home Med)  D/w Dr. Dallas

## 2017-09-03 NOTE — DISCHARGE NOTE ADULT - HOSPITAL COURSE
Pt underwent an elective surgery by Dr. Dallas 9/1/2017 C2-C7 Lami, C3-C6 fusion with instr with no intra-op or post- op complications.  PT eval = Home with outpatient PT, trial of void passed, tolerates oral diet, Pain is well controlled, pt is cleared for the discharge to home Pt underwent an elective surgery by Dr. Dallas 9/1/2017 C2-C7 Lami, C3-C6 fusion with instr with no intra-op or post- op complications.  PT eval = Home with outpatient PT, trial of void passed, tolerates oral diet, Pain is well controlled, pt is cleared for the discharge K[facility] ____________ Pt underwent an elective surgery by Dr. Dallas 9/1/2017 C2-C7 Lami, C3-C6 fusion with instr with no intra-op or post- op complications.  PT eval = Home with outpatient PT, trial of void passed, tolerates oral diet, Pain is well controlled, pt is cleared for discharge. Pt is discharged to [facility] today.

## 2017-09-03 NOTE — DISCHARGE NOTE ADULT - CARE PLAN
Principal Discharge DX:	Spinal cord compression  Goal:	follow up with Dr. Dallas, call to schedule an appt [phone]  Instructions for follow-up, activity and diet:	After leaving the hospital, please follow these instructions:  • Keep your wound clean and dry.  Watch your incision for signs of infection such as: Redness,Swelling and tenderness and Drainage.  You may shower briefly, unless you told not to by your doctor.  Cover your entire incision with a waterproof bandage to make sure it does not get wet. If it gets wet, dry it off.  No tub baths or swimming for two weeks.  Do not strain neck  Take pain medicine as prescribed.  You may find it helpful to take it for morning stiffness or for soreness when trying to  sleep.  Pain medication can cause constipation. Eating food with fiber such as fruits and  vegetables, and drinking liquids may help prevent constipation.  If you are required to wear a cervical collar, please wear it as instructed.  Call you doctor immediately if you have:  Any new numbness, tingling, or weakness in your arms and legs.  • Pain that is getting worse, or not going away after taking pain medicine.  • Fever of 101° F or more. Principal Discharge DX:	Spinal cord compression  Goal:	follow up with Dr. Dallas, call to schedule an appt [phone]  Instructions for follow-up, activity and diet:	After leaving the hospital, please follow these instructions:  • Keep your wound clean and dry.  Watch your incision for signs of infection such as: Redness,Swelling and tenderness and Drainage.  You may shower briefly, unless you told not to by your doctor.  Cover your entire incision with a waterproof bandage to make sure it does not get wet. If it gets wet, dry it off.  No tub baths or swimming for two weeks.  Do not strain neck  Take pain medicine as prescribed.  You may find it helpful to take it for morning stiffness or for soreness when trying to  sleep.  Pain medication can cause constipation. Eating food with fiber such as fruits and  vegetables, and drinking liquids may help prevent constipation.  If you are required to wear a cervical collar, please wear it as instructed.  Call you doctor immediately if you have:  Any new numbness, tingling, or weakness in your arms and legs.  • Pain that is getting worse, or not going away after taking pain medicine.  • Fever of 101° F or more. Principal Discharge DX:	Spinal cord compression  Goal:	follow up with Dr. Dallas, call to schedule an appt [phone]  Instructions for follow-up, activity and diet:	After leaving the hospital, please follow these instructions:  • Keep your wound clean and dry.  Watch your incision for signs of infection such as: Redness,Swelling and tenderness and Drainage.  You may shower briefly, unless you told not to by your doctor.  Cover your entire incision with a waterproof bandage to make sure it does not get wet. If it gets wet, dry it off.  No tub baths or swimming for two weeks.  Do not strain neck  Take pain medicine as prescribed.  You may find it helpful to take it for morning stiffness or for soreness when trying to  sleep.  Pain medication can cause constipation. Eating food with fiber such as fruits and  vegetables, and drinking liquids may help prevent constipation.  If you are required to wear a cervical collar, please wear it as instructed.  Call you doctor immediately if you have:  Any new numbness, tingling, or weakness in your arms and legs.  • Pain that is getting worse, or not going away after taking pain medicine.  • Fever of 101° F or more.

## 2017-09-03 NOTE — DISCHARGE NOTE ADULT - MEDICATION SUMMARY - MEDICATIONS TO TAKE
I will START or STAY ON the medications listed below when I get home from the hospital:    acetaminophen 325 mg oral tablet  -- 2 tab(s) by mouth every 6 hours, As needed, For Temp greater than 38 C (100.4 F)  -- Indication: For Fever    acetaminophen 325 mg oral tablet  -- 2 tab(s) by mouth every 6 hours, As needed, Mild Pain (1 - 3)  -- Indication: For MIld Pain    magnesium hydroxide 8% oral suspension  -- 30 milliliter(s) by mouth every 12 hours, As needed, Constipation  -- Indication: For Constipation    tamsulosin 0.4 mg oral capsule  -- 1 cap(s) by mouth once a day  -- Indication: For BPH    propranolol 20 mg oral tablet  --  by mouth once a day  -- Indication: For Hypertension    enoxaparin  -- 40 milligram(s) subcutaneous once a day (at bedtime)  -- Indication: For DVT prophylaxis    clonazePAM  -- 3 milligram(s) by mouth once a day (at bedtime)  -- Indication: For Anxiety    DULoxetine 60 mg oral delayed release capsule  -- 1 cap(s) by mouth once a day  -- Indication: For Depression    metFORMIN 1000 mg oral tablet  -- 1 tab(s) by mouth 2 times a day  -- Indication: For Diabetes    ondansetron 2 mg/mL injectable solution  --  injectable   -- Indication: For Nausea    Lipitor 20 mg oral tablet  -- 1 tab(s) by mouth once a day  -- Indication: For Hyperlipidemia    risperiDONE 3 mg oral tablet  --  by mouth once a day (at bedtime)  -- Indication: For Antipsychotic    busPIRone 30 mg oral tablet  --  by mouth once a day  -- Indication: For Antipsychotic    zaleplon  -- 20 milligram(s) by mouth once a day (at bedtime)  -- Indication: For Insomnia    senna oral tablet  -- 2 tab(s) by mouth once a day (at bedtime)  -- Indication: For Constipation    bethanechol 25 mg oral tablet  -- 1 tab(s) by mouth 2 times a day  -- Indication: For Urinary retention    simethicone 80 mg oral tablet, chewable  -- 1 tab(s) by mouth 3 times a day, As needed, Gas  -- Indication: For Gas, PRN    cyclobenzaprine 5 mg oral tablet  -- 1 tab(s) by mouth 4 times a day, As needed, Shoulder stiffness, muscle spasm  -- Indication: For Muscle Spasm    pantoprazole 40 mg oral delayed release tablet  -- 1 tab(s) by mouth once a day (before a meal)  -- Indication: For GERD

## 2017-09-03 NOTE — DISCHARGE NOTE ADULT - CARE PROVIDER_API CALL
Wes Dallas), Neurological Surgery  [address], [place] [facility]  Phone: [phone]  Fax: [phone]

## 2017-09-03 NOTE — DISCHARGE NOTE ADULT - PATIENT PORTAL LINK FT
“You can access the Follow[employer] Patient Portal, offered by [facility], by registering with the following website: [email or web address]”

## 2017-09-04 LAB
HCT VFR BLD CALC: 38 % — LOW (ref 39–50)
HGB BLD-MCNC: 13.2 G/DL — SIGNIFICANT CHANGE UP (ref 13–17)
MCHC RBC-ENTMCNC: 31.1 PG — SIGNIFICANT CHANGE UP (ref 27–34)
MCHC RBC-ENTMCNC: 34.7 G/DL — SIGNIFICANT CHANGE UP (ref 32–36)
MCV RBC AUTO: 89.6 FL — SIGNIFICANT CHANGE UP (ref 80–100)
PLATELET # BLD AUTO: 244 K/UL — SIGNIFICANT CHANGE UP (ref 150–400)
RBC # BLD: 4.24 M/UL — SIGNIFICANT CHANGE UP (ref 4.2–5.8)
RBC # FLD: 12.9 % — SIGNIFICANT CHANGE UP (ref 10.3–16.9)
WBC # BLD: 18.2 K/UL — HIGH (ref 3.8–10.5)
WBC # FLD AUTO: 18.2 K/UL — HIGH (ref 3.8–10.5)

## 2017-09-04 RX ADMIN — OXYCODONE AND ACETAMINOPHEN 1 TABLET(S): 5; 325 TABLET ORAL at 21:58

## 2017-09-04 RX ADMIN — Medication 100 MILLIGRAM(S): at 14:48

## 2017-09-04 RX ADMIN — ENOXAPARIN SODIUM 40 MILLIGRAM(S): 100 INJECTION SUBCUTANEOUS at 21:58

## 2017-09-04 RX ADMIN — Medication 2 MILLIGRAM(S): at 05:43

## 2017-09-04 RX ADMIN — Medication 100 MILLIGRAM(S): at 05:43

## 2017-09-04 RX ADMIN — Medication 25 MILLIGRAM(S): at 18:21

## 2017-09-04 RX ADMIN — DULOXETINE HYDROCHLORIDE 60 MILLIGRAM(S): 30 CAPSULE, DELAYED RELEASE ORAL at 11:25

## 2017-09-04 RX ADMIN — METFORMIN HYDROCHLORIDE 1000 MILLIGRAM(S): 850 TABLET ORAL at 07:14

## 2017-09-04 RX ADMIN — TAMSULOSIN HYDROCHLORIDE 0.4 MILLIGRAM(S): 0.4 CAPSULE ORAL at 21:57

## 2017-09-04 RX ADMIN — Medication 25 MILLIGRAM(S): at 06:23

## 2017-09-04 RX ADMIN — Medication 20 MILLIGRAM(S): at 05:44

## 2017-09-04 RX ADMIN — OXYCODONE AND ACETAMINOPHEN 2 TABLET(S): 5; 325 TABLET ORAL at 17:00

## 2017-09-04 RX ADMIN — OXYCODONE AND ACETAMINOPHEN 2 TABLET(S): 5; 325 TABLET ORAL at 00:30

## 2017-09-04 RX ADMIN — Medication 1: at 07:14

## 2017-09-04 RX ADMIN — OXYCODONE AND ACETAMINOPHEN 2 TABLET(S): 5; 325 TABLET ORAL at 10:00

## 2017-09-04 RX ADMIN — Medication 3 MILLIGRAM(S): at 21:57

## 2017-09-04 RX ADMIN — ATORVASTATIN CALCIUM 20 MILLIGRAM(S): 80 TABLET, FILM COATED ORAL at 21:57

## 2017-09-04 RX ADMIN — PANTOPRAZOLE SODIUM 40 MILLIGRAM(S): 20 TABLET, DELAYED RELEASE ORAL at 07:14

## 2017-09-04 RX ADMIN — Medication 2: at 17:45

## 2017-09-04 RX ADMIN — Medication 30 MILLIGRAM(S): at 11:27

## 2017-09-04 RX ADMIN — OXYCODONE AND ACETAMINOPHEN 2 TABLET(S): 5; 325 TABLET ORAL at 09:06

## 2017-09-04 RX ADMIN — SENNA PLUS 2 TABLET(S): 8.6 TABLET ORAL at 21:57

## 2017-09-04 RX ADMIN — RISPERIDONE 3 MILLIGRAM(S): 4 TABLET ORAL at 21:57

## 2017-09-04 RX ADMIN — METFORMIN HYDROCHLORIDE 1000 MILLIGRAM(S): 850 TABLET ORAL at 17:45

## 2017-09-04 RX ADMIN — OXYCODONE AND ACETAMINOPHEN 1 TABLET(S): 5; 325 TABLET ORAL at 22:36

## 2017-09-04 RX ADMIN — OXYCODONE AND ACETAMINOPHEN 2 TABLET(S): 5; 325 TABLET ORAL at 16:20

## 2017-09-04 RX ADMIN — Medication 2 MILLIGRAM(S): at 17:43

## 2017-09-04 RX ADMIN — Medication 100 MILLIGRAM(S): at 21:58

## 2017-09-04 NOTE — PROGRESS NOTE ADULT - SUBJECTIVE AND OBJECTIVE BOX
Pt was examined at the bedside, no acute events were reported overnight, passed pain is well controlled, pt denies sob, cp, n/v    POD#1: Hemovac output 135cc overnight. Neurologically stable/intact. Pain well controlled with PO/IV PRN management. Tolerating PO diet. Pending PT/OT evaluation.  POD#2 Hemovac output 90cc overnight ( Remove today).  Passed trial of void. PT eval=Home with Outpatient PT, pain is well controlled, Metformin 100oBID ( Home Med) restarted  POD#3 Pain is well controlled, family is requesting Rehab Placement, wound dressing is off    ICU Vital Signs Last 24 Hrs  T(C): 36.1 (04 Sep 2017 05:34), Max: 36.8 (03 Sep 2017 20:40)  T(F): 97 (04 Sep 2017 05:34), Max: 98.3 (03 Sep 2017 20:40)  HR: 95 (04 Sep 2017 05:34) (69 - 95)  BP: 133/78 (04 Sep 2017 05:34) (124/60 - 173/76)  BP(mean): --  ABP: --  ABP(mean): --  RR: 16 (04 Sep 2017 05:34) (16 - 17)  SpO2: 94% (04 Sep 2017 05:34) (94% - 100%)      Exam:  AA&Ox3, NAD, coherent speech  CNs II-XII grossly intact  motor 5/5 x 4 extr, no drift  sensation to LT grossly intact,  Back: incision c/d/i, no palpable collections    Plan:  PT eval =Home, with outpatient PT  SQL for DVT prophylaxis  Pain meds PRN, Percocet  Taper Decadron, today is the last day 3/3  SCDs, IS, Bowel Regimen, OOB  Cont Metformin 1000BID ( Home Med)  D/w Dr. Dallas

## 2017-09-05 PROCEDURE — 99233 SBSQ HOSP IP/OBS HIGH 50: CPT | Mod: GC

## 2017-09-05 RX ORDER — CYCLOBENZAPRINE HYDROCHLORIDE 10 MG/1
5 TABLET, FILM COATED ORAL THREE TIMES A DAY
Qty: 0 | Refills: 0 | Status: DISCONTINUED | OUTPATIENT
Start: 2017-09-05 | End: 2017-09-06

## 2017-09-05 RX ORDER — MULTIVIT WITH MIN/MFOLATE/K2 340-15/3 G
250 POWDER (GRAM) ORAL DAILY
Qty: 0 | Refills: 0 | Status: COMPLETED | OUTPATIENT
Start: 2017-09-05 | End: 2017-09-05

## 2017-09-05 RX ORDER — POLYETHYLENE GLYCOL 3350 17 G/17G
17 POWDER, FOR SOLUTION ORAL
Qty: 0 | Refills: 0 | Status: DISCONTINUED | OUTPATIENT
Start: 2017-09-05 | End: 2017-09-06

## 2017-09-05 RX ADMIN — RISPERIDONE 3 MILLIGRAM(S): 4 TABLET ORAL at 21:49

## 2017-09-05 RX ADMIN — PANTOPRAZOLE SODIUM 40 MILLIGRAM(S): 20 TABLET, DELAYED RELEASE ORAL at 06:02

## 2017-09-05 RX ADMIN — OXYCODONE AND ACETAMINOPHEN 2 TABLET(S): 5; 325 TABLET ORAL at 08:48

## 2017-09-05 RX ADMIN — Medication 20 MILLIGRAM(S): at 05:19

## 2017-09-05 RX ADMIN — Medication 250 MILLILITER(S): at 19:00

## 2017-09-05 RX ADMIN — CYCLOBENZAPRINE HYDROCHLORIDE 5 MILLIGRAM(S): 10 TABLET, FILM COATED ORAL at 13:08

## 2017-09-05 RX ADMIN — Medication 1: at 07:18

## 2017-09-05 RX ADMIN — Medication 25 MILLIGRAM(S): at 18:49

## 2017-09-05 RX ADMIN — Medication 30 MILLIGRAM(S): at 11:00

## 2017-09-05 RX ADMIN — DULOXETINE HYDROCHLORIDE 60 MILLIGRAM(S): 30 CAPSULE, DELAYED RELEASE ORAL at 11:00

## 2017-09-05 RX ADMIN — Medication 100 MILLIGRAM(S): at 05:19

## 2017-09-05 RX ADMIN — ATORVASTATIN CALCIUM 20 MILLIGRAM(S): 80 TABLET, FILM COATED ORAL at 21:50

## 2017-09-05 RX ADMIN — METFORMIN HYDROCHLORIDE 1000 MILLIGRAM(S): 850 TABLET ORAL at 08:33

## 2017-09-05 RX ADMIN — POLYETHYLENE GLYCOL 3350 17 GRAM(S): 17 POWDER, FOR SOLUTION ORAL at 08:50

## 2017-09-05 RX ADMIN — Medication 100 MILLIGRAM(S): at 13:08

## 2017-09-05 RX ADMIN — ENOXAPARIN SODIUM 40 MILLIGRAM(S): 100 INJECTION SUBCUTANEOUS at 21:49

## 2017-09-05 RX ADMIN — Medication 25 MILLIGRAM(S): at 05:20

## 2017-09-05 RX ADMIN — OXYCODONE AND ACETAMINOPHEN 2 TABLET(S): 5; 325 TABLET ORAL at 09:48

## 2017-09-05 RX ADMIN — TAMSULOSIN HYDROCHLORIDE 0.4 MILLIGRAM(S): 0.4 CAPSULE ORAL at 21:50

## 2017-09-05 RX ADMIN — CYCLOBENZAPRINE HYDROCHLORIDE 5 MILLIGRAM(S): 10 TABLET, FILM COATED ORAL at 20:43

## 2017-09-05 RX ADMIN — Medication 3 MILLIGRAM(S): at 21:49

## 2017-09-05 RX ADMIN — METFORMIN HYDROCHLORIDE 1000 MILLIGRAM(S): 850 TABLET ORAL at 18:26

## 2017-09-05 NOTE — OCCUPATIONAL THERAPY INITIAL EVALUATION ADULT - RANGE OF MOTION EXAMINATION, UPPER EXTREMITY
bilateral UE Active ROM was WFL  (within functional limits)/bilateral UE Passive ROM was WFL  (within functional limits)/with c/o subjective +stiffness bilateral shoulders

## 2017-09-05 NOTE — PROGRESS NOTE ADULT - SUBJECTIVE AND OBJECTIVE BOX
HPI:  This is a 61 y/o male who was seen twice by Dr. Dallas in the past 6 months.  The following is taken from his initial office visit:    " he has had a history of an  injury approximately 20 years ago when he fell from a ladder. He has been disabled. At that time, he had a head injury, which caused significant personality changes and behavioral disturbance, and he has been on disability ever since. Recently, however, he has been having a new constellation of speech disturbance and ataxia and memory loss.  He had an MRI of his brain and the cervical spine. The MRI of the brain does not show  hydrocephalus per say. The MRI does show diffuse white matter changes bilaterally in the periventricular region, however. The MRI of the cervical spine does show significant compression at the C4-5 level. For some  reason, the actual transaxial views at that level are not particularly clear but there is no questionthere is significant spinal compression and stenosis at that level"    The patient was brought back in June 2017 for review of CT scan, which showed large bony mass with left spinal cord compression.  The case was reviewed in our weekly spine conference and he was brought in electively for decompression and posterior fusion. (01 Sep 2017 09:39)    OVERNIGHT EVENTS:  Vital Signs Last 24 Hrs  T(C): 35.8 (05 Sep 2017 05:06), Max: 36.4 (04 Sep 2017 09:03)  T(F): 96.4 (05 Sep 2017 05:06), Max: 97.6 (04 Sep 2017 09:03)  HR: 68 (05 Sep 2017 05:06) (66 - 77)  BP: 126/73 (05 Sep 2017 05:06) (115/66 - 147/78)  BP(mean): --  RR: 16 (05 Sep 2017 05:06) (15 - 17)  SpO2: 97% (05 Sep 2017 05:06) (96% - 98%)    I&O's Summary    04 Sep 2017 07:01  -  05 Sep 2017 07:00  --------------------------------------------------------  IN: 240 mL / OUT: 2275 mL / NET: -2035 mL        PHYSICAL EXAM:  Neurological:A&)X3 Cranial nerves intact  MAO  Cevical lami incision CDI    Cardiovascular:RRR  Respiratory:Lungs CTAB  Gastrointestinal:+BS  Genitourinary:Voiding without difficulty  Extremities:warm and dry  Incision/Wound: lami incision      DIET: Regular    [LABS:                        13.2   18.2  )-----------( 244      ( 04 Sep 2017 11:19 )             38.0       CAPILLARY BLOOD GLUCOSE  125 (04 Sep 2017 22:20)  213 (04 Sep 2017 17:15)  149 (04 Sep 2017 11:48)      Drug Levels: [] N/A    CSF Analysis: [] N/A      Allergies    No Known Allergies    Intolerances      MEDICATIONS:  Antibiotics:    Neuro:  clonazePAM Tablet 3 milliGRAM(s) Oral at bedtime  DULoxetine 60 milliGRAM(s) Oral daily  risperiDONE   Tablet 3 milliGRAM(s) Oral at bedtime  busPIRone 30 milliGRAM(s) Oral daily  zaleplon 20 milliGRAM(s) Oral at bedtime PRN  acetaminophen   Tablet 650 milliGRAM(s) Oral every 6 hours PRN  acetaminophen   Tablet. 650 milliGRAM(s) Oral every 6 hours PRN  oxyCODONE    5 mG/acetaminophen 325 mG 1 Tablet(s) Oral every 4 hours PRN  oxyCODONE    5 mG/acetaminophen 325 mG 2 Tablet(s) Oral every 6 hours PRN  cyclobenzaprine 10 milliGRAM(s) Oral every 8 hours PRN  ondansetron Injectable 4 milliGRAM(s) IV Push every 6 hours PRN    Anticoagulation:  enoxaparin Injectable 40 milliGRAM(s) SubCutaneous at bedtime    OTHER:  BUpivacaine liposome 1.3% Injectable (no eMAR) 20 milliLiter(s) Local Injection once  tamsulosin 0.4 milliGRAM(s) Oral at bedtime  propranolol 20 milliGRAM(s) Oral daily  atorvastatin 20 milliGRAM(s) Oral at bedtime  bethanechol 25 milliGRAM(s) Oral two times a day  docusate sodium 100 milliGRAM(s) Oral three times a day  magnesium hydroxide Suspension 30 milliLiter(s) Oral every 12 hours PRN  senna 2 Tablet(s) Oral at bedtime  insulin lispro (HumaLOG) corrective regimen sliding scale   SubCutaneous three times a day before meals  dextrose Gel 1 Dose(s) Oral once PRN  dextrose 50% Injectable 12.5 Gram(s) IV Push once  dextrose 50% Injectable 25 Gram(s) IV Push once  dextrose 50% Injectable 25 Gram(s) IV Push once  glucagon  Injectable 1 milliGRAM(s) IntraMuscular once PRN  pantoprazole    Tablet 40 milliGRAM(s) Oral before breakfast  metFORMIN 1000 milliGRAM(s) Oral two times a day with meals    IVF:  dextrose 5%. 1000 milliLiter(s) IV Continuous <Continuous>      ASSESSMENT:    62y Male with PMH of HTN, HLD, DM II, BPH, Depression now s/p Posterior cervical C2-C7 laminectomy, C3-C6 hardware fusion     PLAN:    NEURO:    Monitor neuro status  OT/PT  Pain management  Bowel regime  Continue current medical regime    Dispo: Discussed with attending

## 2017-09-05 NOTE — OCCUPATIONAL THERAPY INITIAL EVALUATION ADULT - PERTINENT HX OF CURRENT PROBLEM, REHAB EVAL
63 y/o male with history of an injury approximately 20 years ago when he fell from a ladder. He has been disabled. At that time, he had a head injury, which caused significant personality changes and behavioral disturbance, and he has been on disability ever since. Recently, however, he has been having a new constellation of speech disturbance and ataxia and memory loss.

## 2017-09-05 NOTE — OCCUPATIONAL THERAPY INITIAL EVALUATION ADULT - DIAGNOSIS, OT EVAL
Impaired functional mobility and Activities of Daily Living 2/2 impaired standing balance, ataxia X 4 extremities, slight cognitive deficits

## 2017-09-05 NOTE — PROGRESS NOTE ADULT - SUBJECTIVE AND OBJECTIVE BOX
Interval Events:Reviewed  Patient seen and examined at bedside.    Patient is a 62y old  Male who presents with a chief complaint of Gait disturbance (03 Sep 2017 01:10)    Okay you was out of bed. Is eating well.  PAST MEDICAL & SURGICAL HISTORY:  DVT (deep venous thrombosis): left leg  MI (myocardial infarction)  DM (diabetes mellitus)  HTN (hypertension)  HLD (hyperlipidemia)  Depression  Spinal cord compression: cervical  History of knee replacement, total, left  Surgery, elective: sinus surgery  Surgery, elective: ankle arthroscopy with open repair  Surgery, elective: lung removal, partial  S/P cholecystectomy      MEDICATIONS:  Pulmonary:    Antimicrobials:    Anticoagulants:  enoxaparin Injectable 40 milliGRAM(s) SubCutaneous at bedtime    Cardiac:  tamsulosin 0.4 milliGRAM(s) Oral at bedtime  propranolol 20 milliGRAM(s) Oral daily      Allergies    No Known Allergies    Intolerances        Vital Signs Last 24 Hrs  T(C): 36.7 (05 Sep 2017 20:39), Max: 36.7 (05 Sep 2017 20:39)  T(F): 98 (05 Sep 2017 20:39), Max: 98 (05 Sep 2017 20:39)  HR: 88 (05 Sep 2017 20:39) (66 - 88)  BP: 128/78 (05 Sep 2017 20:39) (123/71 - 145/69)  BP(mean): --  RR: 16 (05 Sep 2017 20:39) (16 - 17)  SpO2: 99% (05 Sep 2017 20:39) (97% - 99%)    09-04 @ 07:01  -  09-05 @ 07:00  --------------------------------------------------------  IN: 240 mL / OUT: 2275 mL / NET: -2035 mL          LABS:      CBC Full  -  ( 04 Sep 2017 11:19 )  WBC Count : 18.2 K/uL  Hemoglobin : 13.2 g/dL  Hematocrit : 38.0 %  Platelet Count - Automated : 244 K/uL  Mean Cell Volume : 89.6 fL  Mean Cell Hemoglobin : 31.1 pg  Mean Cell Hemoglobin Concentration : 34.7 g/dL  Auto Neutrophil # : x  Auto Lymphocyte # : x  Auto Monocyte # : x  Auto Eosinophil # : x  Auto Basophil # : x  Auto Neutrophil % : x  Auto Lymphocyte % : x  Auto Monocyte % : x  Auto Eosinophil % : x  Auto Basophil % : x                              RADIOLOGY & ADDITIONAL STUDIES (The following images were personally reviewed):  White:                             No  Urine output:                      yes  DVT prophylaxis:         Yes          Flattus:                          Yes     Bowel movement:              No

## 2017-09-05 NOTE — CONSULT NOTE ADULT - CONSULT REASON
S/p posterior cervical lami/fusion    Cervical laminectomy with spinal fusion: Laminectomy C2-C3, C4-C5, C5-C6, C6-C7; Fusion C3-C4, C4-C5, C5-C6

## 2017-09-05 NOTE — OCCUPATIONAL THERAPY INITIAL EVALUATION ADULT - GENERAL OBSERVATIONS, REHAB EVAL
Left hand dominant. Patient cleared for Occupational Therapy by Neurosurgery NP Mercedes and RN Hilaria. Patient received supine in semi-kwok position in non-acute distress, +IV heplockX2, + bilateral sequential compression devices, + posterior cranial incision with staples C/D/I. Patient reports incisional pain rated 5/10, RN Hilaria aware, patient premedicated prior to session.

## 2017-09-05 NOTE — OCCUPATIONAL THERAPY INITIAL EVALUATION ADULT - PLANNED THERAPY INTERVENTIONS, OT EVAL
cognitive, visual perceptual/neuromuscular re-education/strengthening/IADL retraining/ADL retraining/balance training/bed mobility training/fine motor coordination training/motor coordination training/parent/caregiver training.../transfer training

## 2017-09-05 NOTE — OCCUPATIONAL THERAPY INITIAL EVALUATION ADULT - ADDITIONAL COMMENTS
Patient reports ambulating independently with SC indoors and outdoors. Patient states spouse is occasionally assisting him with Activities of Daily Living for safety. Patient reports h/o falls, 3X last month including one from the bed.

## 2017-09-05 NOTE — OCCUPATIONAL THERAPY INITIAL EVALUATION ADULT - MANUAL MUSCLE TESTING RESULTS, REHAB EVAL
c/o pain and recent cervical surgery; bilateral upper extremities shoulder flexion/extension >3+/5, right elbow flexion/extension 5/5, left elbow flexion/extension 4+/5, bilateral hand  5/5; Smile symmetrical, tongue protrusion in midline, cheeks puff and eyebrows elevation grossly intact./grossly assessed due to

## 2017-09-05 NOTE — OCCUPATIONAL THERAPY INITIAL EVALUATION ADULT - MD ORDER
The MRI does show diffuse white matter changes bilaterally in the periventricular region, however. The MRI of the cervical spine does show significant compression at the C4-5 level. Underwent Laminectomy C2-C3, C4-C5, C5-C6, C6-C7; FUSION C3-C4, C4-C5, C5-C6 on 09/01/2017.

## 2017-09-05 NOTE — CONSULT NOTE ADULT - SUBJECTIVE AND OBJECTIVE BOX
NEUROSURGERY PAIN MANAGEMENT CONSULT NOTE    Chief Complaint: S/p cervical lami/fusion    HPI:  This is a 61 y/o male who was seen twice by Dr. Dallas in the past 6 months.  The following is taken from his initial office visit:    " he has had a history of an  injury approximately 20 years ago when he fell from a ladder. He has been disabled. At that time, he had a head injury, which caused significant personality changes and behavioral disturbance, and he has been on disability ever since. Recently, however, he has been having a new constellation of speech disturbance and ataxia and memory loss.  He had an MRI of his brain and the cervical spine. The MRI of the brain does not show  hydrocephalus per say. The MRI does show diffuse white matter changes bilaterally in the periventricular region, however. The MRI of the cervical spine does show significant compression at the C4-5 level. For some  reason, the actual transaxial views at that level are not particularly clear but there is no questionthere is significant spinal compression and stenosis at that level"    The patient was brought back in June 2017 for review of CT scan, which showed large bony mass with left spinal cord compression.  The case was reviewed in our weekly spine conference and he was brought in electively for decompression and posterior fusion. (01 Sep 2017 09:39)      PAST MEDICAL & SURGICAL HISTORY:  DVT (deep venous thrombosis): left leg  MI (myocardial infarction)  DM (diabetes mellitus)  HTN (hypertension)  HLD (hyperlipidemia)  Depression  Spinal cord compression: cervical  History of knee replacement, total, left  Surgery, elective: sinus surgery  Surgery, elective: ankle arthroscopy with open repair  Surgery, elective: lung removal, partial  S/P cholecystectomy    HOME MEDICATIONS:   Please refer to initial HNP    PAIN HOME MEDICATIONS:    Allergies    No Known Allergies    Intolerances        PAIN MEDICATIONS:  clonazePAM Tablet 3 milliGRAM(s) Oral at bedtime  DULoxetine 60 milliGRAM(s) Oral daily  risperiDONE   Tablet 3 milliGRAM(s) Oral at bedtime  busPIRone 30 milliGRAM(s) Oral daily  zaleplon 20 milliGRAM(s) Oral at bedtime PRN  acetaminophen   Tablet 650 milliGRAM(s) Oral every 6 hours PRN  acetaminophen   Tablet. 650 milliGRAM(s) Oral every 6 hours PRN  oxyCODONE    5 mG/acetaminophen 325 mG 1 Tablet(s) Oral every 4 hours PRN  oxyCODONE    5 mG/acetaminophen 325 mG 2 Tablet(s) Oral every 6 hours PRN  cyclobenzaprine 10 milliGRAM(s) Oral every 8 hours PRN  ondansetron Injectable 4 milliGRAM(s) IV Push every 6 hours PRN    Heme:  enoxaparin Injectable 40 milliGRAM(s) SubCutaneous at bedtime    Antibiotics:    Cardiovascular:  tamsulosin 0.4 milliGRAM(s) Oral at bedtime  propranolol 20 milliGRAM(s) Oral daily    GI:  docusate sodium 100 milliGRAM(s) Oral three times a day  magnesium hydroxide Suspension 30 milliLiter(s) Oral every 12 hours PRN  senna 2 Tablet(s) Oral at bedtime  pantoprazole    Tablet 40 milliGRAM(s) Oral before breakfast    Endocrine:  atorvastatin 20 milliGRAM(s) Oral at bedtime  insulin lispro (HumaLOG) corrective regimen sliding scale   SubCutaneous three times a day before meals  dextrose Gel 1 Dose(s) Oral once PRN  dextrose 50% Injectable 12.5 Gram(s) IV Push once  dextrose 50% Injectable 25 Gram(s) IV Push once  dextrose 50% Injectable 25 Gram(s) IV Push once  glucagon  Injectable 1 milliGRAM(s) IntraMuscular once PRN  metFORMIN 1000 milliGRAM(s) Oral two times a day with meals    All Other Medications:  BUpivacaine liposome 1.3% Injectable (no eMAR) 20 milliLiter(s) Local Injection once  bethanechol 25 milliGRAM(s) Oral two times a day  dextrose 5%. 1000 milliLiter(s) IV Continuous <Continuous>      Vital Signs Last 24 Hrs  T(C): 35.8 (05 Sep 2017 05:06), Max: 36.4 (04 Sep 2017 09:03)  T(F): 96.4 (05 Sep 2017 05:06), Max: 97.6 (04 Sep 2017 09:03)  HR: 68 (05 Sep 2017 05:06) (66 - 77)  BP: 126/73 (05 Sep 2017 05:06) (115/66 - 147/78)  BP(mean): --  RR: 16 (05 Sep 2017 05:06) (15 - 17)  SpO2: 97% (05 Sep 2017 05:06) (96% - 98%)    LABS:                        13.2   18.2  )-----------( 244      ( 04 Sep 2017 11:19 )             38.0                 RADIOLOGY:    Drug Screen:    REVIEW OF SYSTEMS:  CONSTITUTIONAL: Per nurse report pt was slightly agitated at night, some sundowning   EYES: No eye pain, visual disturbances  ENMT:  No difficulty hearing, tinnitus. No sinus or throat pain  NECK: Pt reports pain or stiffness  RESPIRATORY: No cough, wheezing, chills or hemoptysis; No shortness of breath  CARDIOVASCULAR: No chest pain, palpitations.   GASTROINTESTINAL: Pt reports passing gas. No bowel movements since preop. No abdominal or epigastric pain. No nausea, vomiting. GENITOURINARY: No dysuria, frequency, or incontinence  NEUROLOGICAL: No headaches, memory loss, LOS, baseline numbness in feet b/l r/t DM, No dizziness or lightheadedness with pain medications.   MUSCULOSKELETAL: ++ Shoulder stiffness, cramping. No joint pain or swelling; No extremity pain    FUNCTIONAL ASSESSMENT:  PAIN SCORE AT REST:   7-8/10       SCALE USED: (1-10 VNRS)  PAIN SCORE WITH ACTIVITY:   8-9/10      SCALE USED: (1-10 VNRS)    PAIN ASSESSMENT:  Pt c/o shoulder stiffness primarily, states that it is consistent, and bothersome. Denies severity of incisional pain, states that it is dull/aching. Reports hx of chronic numbness in feet.     PHYSICAL EXAM  GENERAL: NAD  HEAD:  Atraumatic, Normocephalic  EYES: EOMI, PERRLA, 3mm, conjunctiva and sclera clear  NECK: Supple, no collar, staples MILEY, C/D/I, slight erythema, no swelling around   NERVOUS SYSTEM:    Alert & Oriented X3, ++baseline slurred speech, exp. aphasia. Good concentration;   Cranial nerves grossly intact  Motor Strength 5/5 B/L upper and lower extremities;   Sensation intact to LT in UE/LE in 3 dermatomes  Cervical: Incisional tenderness. Negative Spurling's sign. Negative Banda's sign. Cervical ROM not assessed, s/p surgery and restricted turning.  CHEST/LUNG: Expiratory wheezing, otherwise clear to auscultation bilaterally; No rales, rhonchi, wheezing, or rubs  HEART: Regular rate and rhythm; No murmurs, rubs, or gallops  ABDOMEN: Soft, Nontender, Nondistended; Bowel sounds present    ASSESSMENT:   61yo M w/ worsening gait, s/p cervical laminectomy with spinal fusion: Laminectomy C2-C3, C4-C5, C5-C6, C6-C7; Fusion C3-C4, C4-C5, C5-C6 POD 4    PLAN:   1. Opioids  Since yesterday 6am, pt has required: 2x 10mg Percocet, 1x 5mg Percocet.     PLAN: C/w Percocet dosing. Pt receiving adequate coverage. For rescue, IV Tylenol.    2. Neuropathics  Pt reports hx of DN, diminished sensation in feet b/l. Denies numbness/tingling/electric shock like sensation in LE/UE b.l.    PLAN: No indication for neuropathic agents.     3. Adjuvants  Pt is complaining of muscle spasms/stiffness in upper back/shoulders. Tylenol 650mg q6h PRN for Mild Pain. Pt on Percocet.     PLAN: Schedule Flexeril 5mg TID. TDD<4g.    4. Prophylactic:   Bowel regimen: Docusate Senna;    Nausea PRN: Zofran PRN for Nausea, pt does not c/o current    5. Functional Goals:   Pt will get OOB with PT today. Pt will resume previous level of activity without impairment from surgery.     6. Additional Consults:   None recommended.     7. Additional Labs/Imaging:   None recommended.     8. Follow up, Discharge Planning:   Patient is set for discharge to: Home w/ outpatient PT  Discharge is pending: Workup  Pain Management follow up plan: F/u inpatient/outpatient

## 2017-09-06 VITALS
RESPIRATION RATE: 17 BRPM | TEMPERATURE: 97 F | HEART RATE: 84 BPM | OXYGEN SATURATION: 100 % | SYSTOLIC BLOOD PRESSURE: 139 MMHG | DIASTOLIC BLOOD PRESSURE: 63 MMHG

## 2017-09-06 LAB
ANION GAP SERPL CALC-SCNC: 12 MMOL/L — SIGNIFICANT CHANGE UP (ref 5–17)
BUN SERPL-MCNC: 27 MG/DL — HIGH (ref 7–23)
CALCIUM SERPL-MCNC: 8.9 MG/DL — SIGNIFICANT CHANGE UP (ref 8.4–10.5)
CHLORIDE SERPL-SCNC: 96 MMOL/L — SIGNIFICANT CHANGE UP (ref 96–108)
CK SERPL-CCNC: 124 U/L — SIGNIFICANT CHANGE UP (ref 30–200)
CO2 SERPL-SCNC: 27 MMOL/L — SIGNIFICANT CHANGE UP (ref 22–31)
CREAT SERPL-MCNC: 1.3 MG/DL — SIGNIFICANT CHANGE UP (ref 0.5–1.3)
GLUCOSE SERPL-MCNC: 179 MG/DL — HIGH (ref 70–99)
HCT VFR BLD CALC: 42.1 % — SIGNIFICANT CHANGE UP (ref 39–50)
HGB BLD-MCNC: 14.7 G/DL — SIGNIFICANT CHANGE UP (ref 13–17)
MCHC RBC-ENTMCNC: 31.7 PG — SIGNIFICANT CHANGE UP (ref 27–34)
MCHC RBC-ENTMCNC: 34.9 G/DL — SIGNIFICANT CHANGE UP (ref 32–36)
MCV RBC AUTO: 90.9 FL — SIGNIFICANT CHANGE UP (ref 80–100)
PLATELET # BLD AUTO: 237 K/UL — SIGNIFICANT CHANGE UP (ref 150–400)
POTASSIUM SERPL-MCNC: 4.3 MMOL/L — SIGNIFICANT CHANGE UP (ref 3.5–5.3)
POTASSIUM SERPL-SCNC: 4.3 MMOL/L — SIGNIFICANT CHANGE UP (ref 3.5–5.3)
RBC # BLD: 4.63 M/UL — SIGNIFICANT CHANGE UP (ref 4.2–5.8)
RBC # FLD: 13.1 % — SIGNIFICANT CHANGE UP (ref 10.3–16.9)
SODIUM SERPL-SCNC: 135 MMOL/L — SIGNIFICANT CHANGE UP (ref 135–145)
TROPONIN T SERPL-MCNC: <0.01 NG/ML — SIGNIFICANT CHANGE UP (ref 0–0.01)
WBC # BLD: 17.4 K/UL — HIGH (ref 3.8–10.5)
WBC # FLD AUTO: 17.4 K/UL — HIGH (ref 3.8–10.5)

## 2017-09-06 PROCEDURE — 99233 SBSQ HOSP IP/OBS HIGH 50: CPT | Mod: GC

## 2017-09-06 PROCEDURE — 93010 ELECTROCARDIOGRAM REPORT: CPT

## 2017-09-06 RX ORDER — CYCLOBENZAPRINE HYDROCHLORIDE 10 MG/1
1 TABLET, FILM COATED ORAL
Qty: 0 | Refills: 0 | COMMUNITY
Start: 2017-09-06

## 2017-09-06 RX ORDER — CYCLOBENZAPRINE HYDROCHLORIDE 10 MG/1
5 TABLET, FILM COATED ORAL
Qty: 0 | Refills: 0 | Status: DISCONTINUED | OUTPATIENT
Start: 2017-09-06 | End: 2017-09-06

## 2017-09-06 RX ORDER — SIMETHICONE 80 MG/1
1 TABLET, CHEWABLE ORAL
Qty: 0 | Refills: 0 | COMMUNITY
Start: 2017-09-06

## 2017-09-06 RX ORDER — ZALEPLON 10 MG
20 CAPSULE ORAL AT BEDTIME
Qty: 0 | Refills: 0 | Status: DISCONTINUED | OUTPATIENT
Start: 2017-09-06 | End: 2017-09-06

## 2017-09-06 RX ORDER — ONDANSETRON 8 MG/1
0 TABLET, FILM COATED ORAL
Qty: 0 | Refills: 0 | COMMUNITY
Start: 2017-09-06

## 2017-09-06 RX ORDER — ACETAMINOPHEN 500 MG
2 TABLET ORAL
Qty: 0 | Refills: 0 | COMMUNITY
Start: 2017-09-06

## 2017-09-06 RX ORDER — ENOXAPARIN SODIUM 100 MG/ML
40 INJECTION SUBCUTANEOUS
Qty: 0 | Refills: 0 | COMMUNITY
Start: 2017-09-06

## 2017-09-06 RX ORDER — SENNA PLUS 8.6 MG/1
2 TABLET ORAL
Qty: 0 | Refills: 0 | COMMUNITY
Start: 2017-09-06

## 2017-09-06 RX ORDER — ACETAMINOPHEN 500 MG
1000 TABLET ORAL ONCE
Qty: 0 | Refills: 0 | Status: COMPLETED | OUTPATIENT
Start: 2017-09-06 | End: 2017-09-06

## 2017-09-06 RX ORDER — SIMETHICONE 80 MG/1
80 TABLET, CHEWABLE ORAL THREE TIMES A DAY
Qty: 0 | Refills: 0 | Status: DISCONTINUED | OUTPATIENT
Start: 2017-09-06 | End: 2017-09-06

## 2017-09-06 RX ORDER — MAGNESIUM HYDROXIDE 400 MG/1
30 TABLET, CHEWABLE ORAL
Qty: 0 | Refills: 0 | COMMUNITY
Start: 2017-09-06

## 2017-09-06 RX ORDER — OXYCODONE AND ACETAMINOPHEN 5; 325 MG/1; MG/1
1 TABLET ORAL EVERY 6 HOURS
Qty: 0 | Refills: 0 | Status: DISCONTINUED | OUTPATIENT
Start: 2017-09-06 | End: 2017-09-06

## 2017-09-06 RX ORDER — CLONAZEPAM 1 MG
3 TABLET ORAL AT BEDTIME
Qty: 0 | Refills: 0 | Status: DISCONTINUED | OUTPATIENT
Start: 2017-09-06 | End: 2017-09-06

## 2017-09-06 RX ADMIN — Medication 30 MILLIGRAM(S): at 11:38

## 2017-09-06 RX ADMIN — METFORMIN HYDROCHLORIDE 1000 MILLIGRAM(S): 850 TABLET ORAL at 07:36

## 2017-09-06 RX ADMIN — OXYCODONE AND ACETAMINOPHEN 1 TABLET(S): 5; 325 TABLET ORAL at 15:23

## 2017-09-06 RX ADMIN — OXYCODONE AND ACETAMINOPHEN 1 TABLET(S): 5; 325 TABLET ORAL at 07:36

## 2017-09-06 RX ADMIN — OXYCODONE AND ACETAMINOPHEN 1 TABLET(S): 5; 325 TABLET ORAL at 16:00

## 2017-09-06 RX ADMIN — Medication 1: at 11:35

## 2017-09-06 RX ADMIN — DULOXETINE HYDROCHLORIDE 60 MILLIGRAM(S): 30 CAPSULE, DELAYED RELEASE ORAL at 11:38

## 2017-09-06 RX ADMIN — Medication 25 MILLIGRAM(S): at 06:31

## 2017-09-06 RX ADMIN — PANTOPRAZOLE SODIUM 40 MILLIGRAM(S): 20 TABLET, DELAYED RELEASE ORAL at 06:31

## 2017-09-06 RX ADMIN — CYCLOBENZAPRINE HYDROCHLORIDE 5 MILLIGRAM(S): 10 TABLET, FILM COATED ORAL at 11:38

## 2017-09-06 RX ADMIN — Medication 20 MILLIGRAM(S): at 06:31

## 2017-09-06 RX ADMIN — MAGNESIUM HYDROXIDE 30 MILLILITER(S): 400 TABLET, CHEWABLE ORAL at 06:30

## 2017-09-06 RX ADMIN — CYCLOBENZAPRINE HYDROCHLORIDE 5 MILLIGRAM(S): 10 TABLET, FILM COATED ORAL at 06:31

## 2017-09-06 RX ADMIN — CYCLOBENZAPRINE HYDROCHLORIDE 5 MILLIGRAM(S): 10 TABLET, FILM COATED ORAL at 11:37

## 2017-09-06 RX ADMIN — Medication 400 MILLIGRAM(S): at 11:35

## 2017-09-06 RX ADMIN — OXYCODONE AND ACETAMINOPHEN 1 TABLET(S): 5; 325 TABLET ORAL at 01:40

## 2017-09-06 RX ADMIN — OXYCODONE AND ACETAMINOPHEN 1 TABLET(S): 5; 325 TABLET ORAL at 08:36

## 2017-09-06 RX ADMIN — Medication 100 MILLIGRAM(S): at 06:31

## 2017-09-06 RX ADMIN — OXYCODONE AND ACETAMINOPHEN 1 TABLET(S): 5; 325 TABLET ORAL at 00:41

## 2017-09-06 RX ADMIN — Medication 1000 MILLIGRAM(S): at 11:52

## 2017-09-06 NOTE — PROGRESS NOTE ADULT - SUBJECTIVE AND OBJECTIVE BOX
HPI:  This is a 63 y/o male who was seen twice by Dr. Dallas in the past 6 months.  The following is taken from his initial office visit:    " he has had a history of an  injury approximately 20 years ago when he fell from a ladder. He has been disabled. At that time, he had a head injury, which caused significant personality changes and behavioral disturbance, and he has been on disability ever since. Recently, however, he has been having a new constellation of speech disturbance and ataxia and memory loss.  He had an MRI of his brain and the cervical spine. The MRI of the brain does not show  hydrocephalus per say. The MRI does show diffuse white matter changes bilaterally in the periventricular region, however. The MRI of the cervical spine does show significant compression at the C4-5 level. For some  reason, the actual transaxial views at that level are not particularly clear but there is no questionthere is significant spinal compression and stenosis at that level"    The patient was brought back in June 2017 for review of CT scan, which showed large bony mass with left spinal cord compression.  The case was reviewed in our weekly spine conference and he was brought in electively for decompression and posterior fusion. (01 Sep 2017 09:39)    OVERNIGHT EVENTS:  Vital Signs Last 24 Hrs  T(C): 36.4 (06 Sep 2017 06:27), Max: 36.7 (05 Sep 2017 20:39)  T(F): 97.6 (06 Sep 2017 06:27), Max: 98 (05 Sep 2017 20:39)  HR: 96 (06 Sep 2017 06:27) (66 - 96)  BP: 105/71 (06 Sep 2017 06:27) (105/71 - 145/69)  BP(mean): --  RR: 16 (06 Sep 2017 06:27) (16 - 17)  SpO2: 99% (06 Sep 2017 06:27) (97% - 99%)    I&O's Summary    05 Sep 2017 07:01  -  06 Sep 2017 07:00  --------------------------------------------------------  IN: 0 mL / OUT: 400 mL / NET: -400 mL        PHYSICAL EXAM:  Neurological:A&OX3 Cranial nerves intact  MAO  Cervical lami incision CDI  MILEY no redness or drainage    Cardiovascular:RRR  Respiratory:Lungs CTAB  Gastrointestinal:+BS  Abdomen round and soft   Genitourinary:Voiding without difficulty  Extremities:warma nd dry no edema  :  DIET: Diabetic Diet   [LABS:                        13.2   18.2  )-----------( 244      ( 04 Sep 2017 11:19 )             38.0       CAPILLARY BLOOD GLUCOSE  147 (06 Sep 2017 06:28)  154 (05 Sep 2017 21:59)  140 (05 Sep 2017 16:12)  129 (05 Sep 2017 11:31)      Drug Levels: [] N/A    CSF Analysis: [] N/A      Allergies    No Known Allergies    Intolerances      MEDICATIONS:  Antibiotics:    Neuro:  clonazePAM Tablet 3 milliGRAM(s) Oral at bedtime  DULoxetine 60 milliGRAM(s) Oral daily  risperiDONE   Tablet 3 milliGRAM(s) Oral at bedtime  busPIRone 30 milliGRAM(s) Oral daily  zaleplon 20 milliGRAM(s) Oral at bedtime PRN  acetaminophen   Tablet 650 milliGRAM(s) Oral every 6 hours PRN  acetaminophen   Tablet. 650 milliGRAM(s) Oral every 6 hours PRN  oxyCODONE    5 mG/acetaminophen 325 mG 1 Tablet(s) Oral every 4 hours PRN  oxyCODONE    5 mG/acetaminophen 325 mG 2 Tablet(s) Oral every 6 hours PRN  ondansetron Injectable 4 milliGRAM(s) IV Push every 6 hours PRN  cyclobenzaprine 5 milliGRAM(s) Oral three times a day    Anticoagulation:  enoxaparin Injectable 40 milliGRAM(s) SubCutaneous at bedtime    OTHER:  BUpivacaine liposome 1.3% Injectable (no eMAR) 20 milliLiter(s) Local Injection once  tamsulosin 0.4 milliGRAM(s) Oral at bedtime  propranolol 20 milliGRAM(s) Oral daily  atorvastatin 20 milliGRAM(s) Oral at bedtime  bethanechol 25 milliGRAM(s) Oral two times a day  docusate sodium 100 milliGRAM(s) Oral three times a day  magnesium hydroxide Suspension 30 milliLiter(s) Oral every 12 hours PRN  senna 2 Tablet(s) Oral at bedtime  insulin lispro (HumaLOG) corrective regimen sliding scale   SubCutaneous Before meals and at bedtime  dextrose Gel 1 Dose(s) Oral once PRN  dextrose 50% Injectable 12.5 Gram(s) IV Push once  dextrose 50% Injectable 25 Gram(s) IV Push once  dextrose 50% Injectable 25 Gram(s) IV Push once  glucagon  Injectable 1 milliGRAM(s) IntraMuscular once PRN  pantoprazole    Tablet 40 milliGRAM(s) Oral before breakfast  metFORMIN 1000 milliGRAM(s) Oral two times a day with meals  polyethylene glycol 3350 17 Gram(s) Oral two times a day PRN    IVF:  dextrose 5%. 1000 milliLiter(s) IV Continuous <Continuous>        ASSESSMENT:  62y Male s/p Posterior cervical C2-C7 laminectomy, C3-C6 hardware fusion   HTN DM HLD Depression BPH    PLAN:  NEURO:  Monitor neuro statsu  OT/PT  Pain Managemnt  Bowel regime  Continue current medica regime  F/U AM labs  Routine Dopplers BLE secondary prolong bedrest    Dispo: Discussed with attending HPI:  This is a 61 y/o male who was seen twice by Dr. Dallas in the past 6 months.  The following is taken from his initial office visit:    " he has had a history of an  injury approximately 20 years ago when he fell from a ladder. He has been disabled. At that time, he had a head injury, which caused significant personality changes and behavioral disturbance, and he has been on disability ever since. Recently, however, he has been having a new constellation of speech disturbance and ataxia and memory loss.  He had an MRI of his brain and the cervical spine. The MRI of the brain does not show  hydrocephalus per say. The MRI does show diffuse white matter changes bilaterally in the periventricular region, however. The MRI of the cervical spine does show significant compression at the C4-5 level. For some  reason, the actual transaxial views at that level are not particularly clear but there is no questionthere is significant spinal compression and stenosis at that level"    The patient was brought back in June 2017 for review of CT scan, which showed large bony mass with left spinal cord compression.  The case was reviewed in our weekly spine conference and he was brought in electively for decompression and posterior fusion. (01 Sep 2017 09:39)    OVERNIGHT EVENTS:  Vital Signs Last 24 Hrs  T(C): 36.4 (06 Sep 2017 06:27), Max: 36.7 (05 Sep 2017 20:39)  T(F): 97.6 (06 Sep 2017 06:27), Max: 98 (05 Sep 2017 20:39)  HR: 96 (06 Sep 2017 06:27) (66 - 96)  BP: 105/71 (06 Sep 2017 06:27) (105/71 - 145/69)  BP(mean): --  RR: 16 (06 Sep 2017 06:27) (16 - 17)  SpO2: 99% (06 Sep 2017 06:27) (97% - 99%)    I&O's Summary    05 Sep 2017 07:01  -  06 Sep 2017 07:00  --------------------------------------------------------  IN: 0 mL / OUT: 400 mL / NET: -400 mL        PHYSICAL EXAM:  Neurological:A&OX3 Cranial nerves intact  MAO  Cervical lami incision CDI  MILEY no redness or drainage    Cardiovascular:RRR  Respiratory:Lungs CTAB  Gastrointestinal:+BS  Abdomen round and soft   Genitourinary:Voiding without difficulty  Extremities:warma nd dry no edema  :  DIET: Diabetic Diet   [LABS:                        13.2   18.2  )-----------( 244      ( 04 Sep 2017 11:19 )             38.0       CAPILLARY BLOOD GLUCOSE  147 (06 Sep 2017 06:28)  154 (05 Sep 2017 21:59)  140 (05 Sep 2017 16:12)  129 (05 Sep 2017 11:31)      Drug Levels: [] N/A    CSF Analysis: [] N/A      Allergies    No Known Allergies    Intolerances      MEDICATIONS:  Antibiotics:    Neuro:  clonazePAM Tablet 3 milliGRAM(s) Oral at bedtime  DULoxetine 60 milliGRAM(s) Oral daily  risperiDONE   Tablet 3 milliGRAM(s) Oral at bedtime  busPIRone 30 milliGRAM(s) Oral daily  zaleplon 20 milliGRAM(s) Oral at bedtime PRN  acetaminophen   Tablet 650 milliGRAM(s) Oral every 6 hours PRN  acetaminophen   Tablet. 650 milliGRAM(s) Oral every 6 hours PRN  oxyCODONE    5 mG/acetaminophen 325 mG 1 Tablet(s) Oral every 4 hours PRN  oxyCODONE    5 mG/acetaminophen 325 mG 2 Tablet(s) Oral every 6 hours PRN  ondansetron Injectable 4 milliGRAM(s) IV Push every 6 hours PRN  cyclobenzaprine 5 milliGRAM(s) Oral three times a day    Anticoagulation:  enoxaparin Injectable 40 milliGRAM(s) SubCutaneous at bedtime    OTHER:  BUpivacaine liposome 1.3% Injectable (no eMAR) 20 milliLiter(s) Local Injection once  tamsulosin 0.4 milliGRAM(s) Oral at bedtime  propranolol 20 milliGRAM(s) Oral daily  atorvastatin 20 milliGRAM(s) Oral at bedtime  bethanechol 25 milliGRAM(s) Oral two times a day  docusate sodium 100 milliGRAM(s) Oral three times a day  magnesium hydroxide Suspension 30 milliLiter(s) Oral every 12 hours PRN  senna 2 Tablet(s) Oral at bedtime  insulin lispro (HumaLOG) corrective regimen sliding scale   SubCutaneous Before meals and at bedtime  dextrose Gel 1 Dose(s) Oral once PRN  dextrose 50% Injectable 12.5 Gram(s) IV Push once  dextrose 50% Injectable 25 Gram(s) IV Push once  dextrose 50% Injectable 25 Gram(s) IV Push once  glucagon  Injectable 1 milliGRAM(s) IntraMuscular once PRN  pantoprazole    Tablet 40 milliGRAM(s) Oral before breakfast  metFORMIN 1000 milliGRAM(s) Oral two times a day with meals  polyethylene glycol 3350 17 Gram(s) Oral two times a day PRN    IVF:  dextrose 5%. 1000 milliLiter(s) IV Continuous <Continuous>        ASSESSMENT:  62y Male s/p Posterior cervical C2-C7 laminectomy, C3-C6 hardware fusion   HTN DM HLD Depression BPH    PLAN:  NEURO:  Monitor neuro statsu  OT/PT  Pain Managemnt  Bowel regime  Continue current medica regime  F/U AM labs  Routine Dopplers BLE secondary prolong bedrest    Dispo: Discussed with attending    Addendum: t around 10am c/o chest pain non radiating  VSS 12 ekg stable troponins sent pending results  Pt c/o gas pains, gave some laxatives  +BM  no ore chest pain noted  Pt resting comfortably

## 2017-09-06 NOTE — PROGRESS NOTE ADULT - PROBLEM SELECTOR PLAN 5
Patient is stable postoperatively. Agreed to discontinue monitor. Increase activity. Pain is controlled
Patient is stable postoperatively. Agreed to discontinue monitor. Increase activity. Pain is controlled

## 2017-09-06 NOTE — PROGRESS NOTE ADULT - PROBLEM SELECTOR PLAN 2
That blood sugar is controlled continue insulin sliding-scale.  continue metformin
That blood sugar is controlled continue insulin sliding-scale

## 2017-09-06 NOTE — PROVIDER CONTACT NOTE (CHANGE IN STATUS NOTIFICATION) - ACTION/TREATMENT ORDERED:
EKG obtained. Labs ordered. NP in to assess patient at bedside. Shortly after patient's complaint, had multiple BM and some diarrhea after laxatives. States feel "better". Will continue to monitor.

## 2017-09-06 NOTE — PROGRESS NOTE ADULT - ATTENDING COMMENTS
I reviewed the preop evaluation and cardiac workup was negative.  Pain is controlled
Pain is controlled

## 2017-09-06 NOTE — PROGRESS NOTE ADULT - SUBJECTIVE AND OBJECTIVE BOX
NEUROSURGERY PAIN MANAGEMENT CONSULT NOTE    Chief Complaint:    HPI:  This is a 63 y/o male who was seen twice by Dr. Dallas in the past 6 months.  The following is taken from his initial office visit:    " he has had a history of an  injury approximately 20 years ago when he fell from a ladder. He has been disabled. At that time, he had a head injury, which caused significant personality changes and behavioral disturbance, and he has been on disability ever since. Recently, however, he has been having a new constellation of speech disturbance and ataxia and memory loss.  He had an MRI of his brain and the cervical spine. The MRI of the brain does not show  hydrocephalus per say. The MRI does show diffuse white matter changes bilaterally in the periventricular region, however. The MRI of the cervical spine does show significant compression at the C4-5 level. For some  reason, the actual transaxial views at that level are not particularly clear but there is no questionthere is significant spinal compression and stenosis at that level"    The patient was brought back in June 2017 for review of CT scan, which showed large bony mass with left spinal cord compression.  The case was reviewed in our weekly spine conference and he was brought in electively for decompression and posterior fusion. (01 Sep 2017 09:39)      PAST MEDICAL & SURGICAL HISTORY:  DVT (deep venous thrombosis): left leg  MI (myocardial infarction)  DM (diabetes mellitus)  HTN (hypertension)  HLD (hyperlipidemia)  Depression  Spinal cord compression: cervical  History of knee replacement, total, left  Surgery, elective: sinus surgery  Surgery, elective: ankle arthroscopy with open repair  Surgery, elective: lung removal, partial  S/P cholecystectomy      FAMILY HISTORY:      SOCIAL HISTORY:  [ ] Denies Smoking, Alcohol, or Drug Use    HOME MEDICATIONS:   Please refer to initial HNP    PAIN HOME MEDICATIONS:    Allergies    No Known Allergies    Intolerances        PAIN MEDICATIONS:  clonazePAM Tablet 3 milliGRAM(s) Oral at bedtime  DULoxetine 60 milliGRAM(s) Oral daily  risperiDONE   Tablet 3 milliGRAM(s) Oral at bedtime  busPIRone 30 milliGRAM(s) Oral daily  zaleplon 20 milliGRAM(s) Oral at bedtime PRN  acetaminophen   Tablet 650 milliGRAM(s) Oral every 6 hours PRN  acetaminophen   Tablet. 650 milliGRAM(s) Oral every 6 hours PRN  oxyCODONE    5 mG/acetaminophen 325 mG 1 Tablet(s) Oral every 4 hours PRN  ondansetron Injectable 4 milliGRAM(s) IV Push every 6 hours PRN  cyclobenzaprine 5 milliGRAM(s) Oral four times a day PRN    Heme:  enoxaparin Injectable 40 milliGRAM(s) SubCutaneous at bedtime    Antibiotics:    Cardiovascular:  tamsulosin 0.4 milliGRAM(s) Oral at bedtime  propranolol 20 milliGRAM(s) Oral daily    GI:  docusate sodium 100 milliGRAM(s) Oral three times a day  magnesium hydroxide Suspension 30 milliLiter(s) Oral every 12 hours PRN  senna 2 Tablet(s) Oral at bedtime  pantoprazole    Tablet 40 milliGRAM(s) Oral before breakfast  polyethylene glycol 3350 17 Gram(s) Oral two times a day PRN    Endocrine:  atorvastatin 20 milliGRAM(s) Oral at bedtime  insulin lispro (HumaLOG) corrective regimen sliding scale   SubCutaneous Before meals and at bedtime  dextrose Gel 1 Dose(s) Oral once PRN  dextrose 50% Injectable 12.5 Gram(s) IV Push once  dextrose 50% Injectable 25 Gram(s) IV Push once  dextrose 50% Injectable 25 Gram(s) IV Push once  glucagon  Injectable 1 milliGRAM(s) IntraMuscular once PRN  metFORMIN 1000 milliGRAM(s) Oral two times a day with meals    All Other Medications:  BUpivacaine liposome 1.3% Injectable (no eMAR) 20 milliLiter(s) Local Injection once  bethanechol 25 milliGRAM(s) Oral two times a day  dextrose 5%. 1000 milliLiter(s) IV Continuous <Continuous>      Vital Signs Last 24 Hrs  T(C): 36.4 (06 Sep 2017 06:27), Max: 36.7 (05 Sep 2017 20:39)  T(F): 97.6 (06 Sep 2017 06:27), Max: 98 (05 Sep 2017 20:39)  HR: 96 (06 Sep 2017 06:27) (66 - 96)  BP: 105/71 (06 Sep 2017 06:27) (105/71 - 145/69)  BP(mean): --  RR: 16 (06 Sep 2017 06:27) (16 - 17)  SpO2: 99% (06 Sep 2017 06:27) (97% - 99%)    LABS:                          13.2   18.2  )-----------( 244      ( 04 Sep 2017 11:19 )             38.0     REVIEW OF SYSTEMS:  CONSTITUTIONAL: Pt continues to wax/wane in alertness. This AM was drowsy, arousable to verbal stim, but then oriented an hour later. Pt is reorientable, but has hx of TBI.    EYES: No eye pain, visual disturbances  ENMT: No difficulty hearing, tinnitus. No sinus or throat pain  NECK: Pt reports pain or stiffness  RESPIRATORY: No cough, wheezing, chills or hemoptysis; No shortness of breath  CARDIOVASCULAR: Pt was c/o chest pain this AM, gas related. No palpitations.   GASTROINTESTINAL: Pt reports passing gas. Pt states he had a BM, but did not get OOB  since preop. No ab/epigastric pain. No nausea, vomiting.   NEUROLOGICAL: No headaches, memory loss, LOS, baseline numbness in feet b/l r/t DM, No dizziness or lightheadedness with pain medications.   MUSCULOSKELETAL: Pt cont to c/o severe b/l shoulder stiffness & cramping, unalleviated by Flexeril dosing. No joint pain or swelling; No extremity pain    FUNCTIONAL ASSESSMENT:  PAIN SCORE AT REST:   5-6/10       SCALE USED: (1-10 VNRS)  PAIN SCORE WITH ACTIVITY:   7-8/10      SCALE USED: (1-10 VNRS)    PAIN ASSESSMENT:  Pt cont to c/o shoulder stiffness primarily, states that it is consistent, and bothersome. Denies severity of  dull/aching incisional pain. Has hx of chronic numbness in feet.     PHYSICAL EXAM  GENERAL: NAD  HEAD:  Atraumatic, Normocephalic  EYES: EOMI, PERRLA, 3mm, conjunctiva and sclera clear  NECK: Supple, no collar, staples MILEY, C/D/I, slight erythema, no swelling around   NERVOUS SYSTEM:    Alert & Oriented X2 (wax/wane baseline), ++baseline slurred speech, exp. aphasia. Poor concentration on exam today;   Cranial nerves grossly intact  Motor Strength 5/5 B/L upper and lower extremities;   Sensation intact to LT in UE/LE in 3 dermatomes  Cervical: Incisional tenderness. Negative Spurling's sign. Negative Banda's sign. Cervical ROM not assessed, s/p surgery and restricted turning.  HEART: Regular rate and rhythm; No murmurs, rubs, or gallops  ABDOMEN: Softly distended, compressible. Nontender. Hyperactive bowel sounds present    ASSESSMENT:   63yo M w/ worsening gait, s/p cervical laminectomy with spinal fusion: Laminectomy C2-C3, C4-C5, C5-C6, C6-C7; Fusion C3-C4, C4-C5, C5-C6 POD 5    PLAN:   1. Opioids  Since yesterday 6am, pt has required: 2x 5mg Percocet, 1x 10mg Percocet.     PLAN: Decreased dosing to 5mg q6h PRN, dcd Percocet 10mg dosing d/t . For rescue, IV Tylenol.    2. Neuropathics  Pt reports hx of DN, diminished sensation in feet b/l. Denies numbness/tingling/electric shock like sensation in LE/UE b.l.    PLAN: No indication for neuropathic agents. Would not add Gabapentin for polypharmacy interactions.     3. Adjuvants  Pt cont to complain of muscle spasms/stiffness in upper back/shoulders. Tylenol 650mg q6h PRN for Mild Pain. Pt on Percocet.     PLAN: Increase Flexeril 5mg every 6 hours vs increasing dosage. TDD<4g of Tylenol.    4. Prophylactic:   Bowel regimen: Docusate Senna; Miralax, Mag Hydrox. Mag Citrate yesterday  Nausea PRN: Zofran PRN for Nausea, pt does not c/o current    5. Functional Goals:   Pt will get OOB with PT/OT today. Pt will resume previous level of activity without impairment from surgery.     6. Additional Consults:   None recommended.     7. Additional Labs/Imaging:   None recommended.     8. Follow up, Discharge Planning:   Patient is set for discharge to:[facility]  Discharge is pending: Placement  Pain Management follow up plan: F/u inpatient/outpatient NEUROSURGERY PAIN MANAGEMENT PROGRESS NOTE    O/N:   - No acute overnight events, pt c/t c/o mod stiffness in shoulders, expected post-op  - Pt MS wax/wanes, awake & alert, then drowsy    PLAN:   - Decrease dosing/frequency of Percocet 5q6 PRN   - Increase frequency of Flexeril 5q6 PRN      HPI:  This is a 63 y/o male who was seen twice by Dr. Dallas in the past 6 months.  The following is taken from his initial office visit:    " he has had a history of an  injury approximately 20 years ago when he fell from a ladder. He has been disabled. At that time, he had a head injury, which caused significant personality changes and behavioral disturbance, and he has been on disability ever since. Recently, however, he has been having a new constellation of speech disturbance and ataxia and memory loss.  He had an MRI of his brain and the cervical spine. The MRI of the brain does not show  hydrocephalus per say. The MRI does show diffuse white matter changes bilaterally in the periventricular region, however. The MRI of the cervical spine does show significant compression at the C4-5 level. For some  reason, the actual transaxial views at that level are not particularly clear but there is no questionthere is significant spinal compression and stenosis at that level"    The patient was brought back in June 2017 for review of CT scan, which showed large bony mass with left spinal cord compression.  The case was reviewed in our weekly spine conference and he was brought in electively for decompression and posterior fusion. (01 Sep 2017 09:39)      PAST MEDICAL & SURGICAL HISTORY:  DVT (deep venous thrombosis): left leg  MI (myocardial infarction)  DM (diabetes mellitus)  HTN (hypertension)  HLD (hyperlipidemia)  Depression  Spinal cord compression: cervical  History of knee replacement, total, left  Surgery, elective: sinus surgery  Surgery, elective: ankle arthroscopy with open repair  Surgery, elective: lung removal, partial  S/P cholecystectomy      FAMILY HISTORY:      SOCIAL HISTORY:  [ ] Denies Smoking, Alcohol, or Drug Use    HOME MEDICATIONS:   Please refer to initial HNP    PAIN HOME MEDICATIONS:    Allergies    No Known Allergies    Intolerances        PAIN MEDICATIONS:  clonazePAM Tablet 3 milliGRAM(s) Oral at bedtime  DULoxetine 60 milliGRAM(s) Oral daily  risperiDONE   Tablet 3 milliGRAM(s) Oral at bedtime  busPIRone 30 milliGRAM(s) Oral daily  zaleplon 20 milliGRAM(s) Oral at bedtime PRN  acetaminophen   Tablet 650 milliGRAM(s) Oral every 6 hours PRN  acetaminophen   Tablet. 650 milliGRAM(s) Oral every 6 hours PRN  oxyCODONE    5 mG/acetaminophen 325 mG 1 Tablet(s) Oral every 4 hours PRN  ondansetron Injectable 4 milliGRAM(s) IV Push every 6 hours PRN  cyclobenzaprine 5 milliGRAM(s) Oral four times a day PRN    Heme:  enoxaparin Injectable 40 milliGRAM(s) SubCutaneous at bedtime    Antibiotics:    Cardiovascular:  tamsulosin 0.4 milliGRAM(s) Oral at bedtime  propranolol 20 milliGRAM(s) Oral daily    GI:  docusate sodium 100 milliGRAM(s) Oral three times a day  magnesium hydroxide Suspension 30 milliLiter(s) Oral every 12 hours PRN  senna 2 Tablet(s) Oral at bedtime  pantoprazole    Tablet 40 milliGRAM(s) Oral before breakfast  polyethylene glycol 3350 17 Gram(s) Oral two times a day PRN    Endocrine:  atorvastatin 20 milliGRAM(s) Oral at bedtime  insulin lispro (HumaLOG) corrective regimen sliding scale   SubCutaneous Before meals and at bedtime  dextrose Gel 1 Dose(s) Oral once PRN  dextrose 50% Injectable 12.5 Gram(s) IV Push once  dextrose 50% Injectable 25 Gram(s) IV Push once  dextrose 50% Injectable 25 Gram(s) IV Push once  glucagon  Injectable 1 milliGRAM(s) IntraMuscular once PRN  metFORMIN 1000 milliGRAM(s) Oral two times a day with meals    All Other Medications:  BUpivacaine liposome 1.3% Injectable (no eMAR) 20 milliLiter(s) Local Injection once  bethanechol 25 milliGRAM(s) Oral two times a day  dextrose 5%. 1000 milliLiter(s) IV Continuous <Continuous>      Vital Signs Last 24 Hrs  T(C): 36.4 (06 Sep 2017 06:27), Max: 36.7 (05 Sep 2017 20:39)  T(F): 97.6 (06 Sep 2017 06:27), Max: 98 (05 Sep 2017 20:39)  HR: 96 (06 Sep 2017 06:27) (66 - 96)  BP: 105/71 (06 Sep 2017 06:27) (105/71 - 145/69)  BP(mean): --  RR: 16 (06 Sep 2017 06:27) (16 - 17)  SpO2: 99% (06 Sep 2017 06:27) (97% - 99%)    LABS:                          13.2   18.2  )-----------( 244      ( 04 Sep 2017 11:19 )             38.0     REVIEW OF SYSTEMS:  CONSTITUTIONAL: Pt continues to wax/wane in alertness. This AM was drowsy, arousable to verbal stim, but then oriented an hour later. Pt is reorientable, but has hx of TBI.    EYES: No eye pain, visual disturbances  ENMT: No difficulty hearing, tinnitus. No sinus or throat pain  NECK: Pt reports pain or stiffness  RESPIRATORY: No cough, wheezing, chills or hemoptysis; No shortness of breath  CARDIOVASCULAR: Pt was c/o chest pain this AM, gas related. No palpitations.   GASTROINTESTINAL: Pt reports passing gas. Pt states he had a BM, but did not get OOB  since preop. No ab/epigastric pain. No nausea, vomiting.   NEUROLOGICAL: No headaches, memory loss, LOS, baseline numbness in feet b/l r/t DM, No dizziness or lightheadedness with pain medications.   MUSCULOSKELETAL: Pt cont to c/o severe b/l shoulder stiffness & cramping, unalleviated by Flexeril dosing. No joint pain or swelling; No extremity pain    FUNCTIONAL ASSESSMENT:  PAIN SCORE AT REST:   5-6/10       SCALE USED: (1-10 VNRS)  PAIN SCORE WITH ACTIVITY:   7-8/10      SCALE USED: (1-10 VNRS)    PAIN ASSESSMENT:  Pt cont to c/o shoulder stiffness primarily, states that it is consistent, and bothersome. Denies severity of  dull/aching incisional pain. Has hx of chronic numbness in feet.     PHYSICAL EXAM  GENERAL: NAD  HEAD:  Atraumatic, Normocephalic  EYES: EOMI, PERRLA, 3mm, conjunctiva and sclera clear  NECK: Supple, no collar, staples MILEY, C/D/I, slight erythema, no swelling around   NERVOUS SYSTEM:    Alert & Oriented X2 (wax/wane baseline), ++baseline slurred speech, exp. aphasia. Poor concentration on exam today;   Cranial nerves grossly intact  Motor Strength 5/5 B/L upper and lower extremities;   Sensation intact to LT in UE/LE in 3 dermatomes  Cervical: Incisional tenderness. Negative Spurling's sign. Negative Banda's sign. Cervical ROM not assessed, s/p surgery and restricted turning.  HEART: Regular rate and rhythm; No murmurs, rubs, or gallops  ABDOMEN: Softly distended, compressible. Nontender. Hyperactive bowel sounds present    ASSESSMENT:   63yo M w/ worsening gait, s/p cervical laminectomy with spinal fusion: Laminectomy C2-C3, C4-C5, C5-C6, C6-C7; Fusion C3-C4, C4-C5, C5-C6 POD 5    PLAN:   1. Opioids  Since yesterday 6am, pt has required: 2x 5mg Percocet, 1x 10mg Percocet.     PLAN: Decreased dosing to 5mg q6h PRN, dcd Percocet 10mg dosing d/t . For rescue, IV Tylenol.    2. Neuropathics  Pt reports hx of DN, diminished sensation in feet b/l. Denies numbness/tingling/electric shock like sensation in LE/UE b.l.    PLAN: No indication for neuropathic agents. Would not add Gabapentin for polypharmacy interactions.     3. Adjuvants  Pt cont to complain of muscle spasms/stiffness in upper back/shoulders. Tylenol 650mg q6h PRN for Mild Pain. Pt on Percocet.     PLAN: Increase Flexeril 5mg every 6 hours vs increasing dosage. TDD<4g of Tylenol.    4. Prophylactic:   Bowel regimen: Docusate Senna; Miralax, Mag Hydrox. Mag Citrate yesterday  Nausea PRN: Zofran PRN for Nausea, pt does not c/o current    5. Functional Goals:   Pt will get OOB with PT/OT today. Pt will resume previous level of activity without impairment from surgery.     6. Additional Consults:   None recommended.     7. Additional Labs/Imaging:   None recommended.     8. Follow up, Discharge Planning:   Patient is set for discharge to:[facility]  Discharge is pending: Placement  Pain Management follow up plan: F/u inpatient/outpatient

## 2017-09-06 NOTE — PROGRESS NOTE ADULT - SUBJECTIVE AND OBJECTIVE BOX
Interval Events:reviewed  Patient seen and examined at bedside.    Patient is a 62y old  Male who presents with a chief complaint of Gait disturbance (03 Sep 2017 01:10)  . He is eating. He is doing okay. He was out of bed in chair.he had a bowel movement  PAST MEDICAL & SURGICAL HISTORY:  DVT (deep venous thrombosis): left leg  MI (myocardial infarction)  DM (diabetes mellitus)  HTN (hypertension)  HLD (hyperlipidemia)  Depression  Spinal cord compression: cervical  History of knee replacement, total, left  Surgery, elective: sinus surgery  Surgery, elective: ankle arthroscopy with open repair  Surgery, elective: lung removal, partial  S/P cholecystectomy      MEDICATIONS:  Pulmonary:    Antimicrobials:    Anticoagulants:  enoxaparin Injectable 40 milliGRAM(s) SubCutaneous at bedtime    Cardiac:  tamsulosin 0.4 milliGRAM(s) Oral at bedtime  propranolol 20 milliGRAM(s) Oral daily      Allergies    No Known Allergies    Intolerances        Vital Signs Last 24 Hrs  T(C): 36.1 (06 Sep 2017 15:41), Max: 36.7 (05 Sep 2017 20:39)  T(F): 97 (06 Sep 2017 15:41), Max: 98 (05 Sep 2017 20:39)  HR: 84 (06 Sep 2017 15:41) (77 - 96)  BP: 139/63 (06 Sep 2017 15:41) (101/54 - 139/63)  BP(mean): --  RR: 17 (06 Sep 2017 15:41) (16 - 18)  SpO2: 100% (06 Sep 2017 15:41) (99% - 100%)    09-05 @ 07:01  -  09-06 @ 07:00  --------------------------------------------------------  IN: 0 mL / OUT: 400 mL / NET: -400 mL          LABS:      CBC Full  -  ( 06 Sep 2017 11:09 )  WBC Count : 17.4 K/uL  Hemoglobin : 14.7 g/dL  Hematocrit : 42.1 %  Platelet Count - Automated : 237 K/uL  Mean Cell Volume : 90.9 fL  Mean Cell Hemoglobin : 31.7 pg  Mean Cell Hemoglobin Concentration : 34.9 g/dL  Auto Neutrophil # : x  Auto Lymphocyte # : x  Auto Monocyte # : x  Auto Eosinophil # : x  Auto Basophil # : x  Auto Neutrophil % : x  Auto Lymphocyte % : x  Auto Monocyte % : x  Auto Eosinophil % : x  Auto Basophil % : x    09-06    135  |  96  |  27<H>  ----------------------------<  179<H>  4.3   |  27  |  1.30    Ca    8.9      06 Sep 2017 11:09                          RADIOLOGY & ADDITIONAL STUDIES (The following images were personally reviewed):  White:                                    No  Urine output:               Yes          DVT prophylaxis:         Yes          Flattus:                          Yes          Bowel movement:       Yes

## 2017-09-06 NOTE — PROGRESS NOTE ADULT - PROBLEM SELECTOR PLAN 1
Patient had a history of the after the left knee replacement.  Patient was started on Lovenox prophylaxis
Patient had a history of the after the left knee replacement.  Patient was started on Lovenox prophylaxis

## 2017-09-08 DIAGNOSIS — I10 ESSENTIAL (PRIMARY) HYPERTENSION: ICD-10-CM

## 2017-09-08 DIAGNOSIS — Z90.2 ACQUIRED ABSENCE OF LUNG [PART OF]: ICD-10-CM

## 2017-09-08 DIAGNOSIS — Z90.49 ACQUIRED ABSENCE OF OTHER SPECIFIED PARTS OF DIGESTIVE TRACT: ICD-10-CM

## 2017-09-08 DIAGNOSIS — R27.0 ATAXIA, UNSPECIFIED: ICD-10-CM

## 2017-09-08 DIAGNOSIS — M48.02 SPINAL STENOSIS, CERVICAL REGION: ICD-10-CM

## 2017-09-08 DIAGNOSIS — F32.9 MAJOR DEPRESSIVE DISORDER, SINGLE EPISODE, UNSPECIFIED: ICD-10-CM

## 2017-09-08 DIAGNOSIS — I25.2 OLD MYOCARDIAL INFARCTION: ICD-10-CM

## 2017-09-08 DIAGNOSIS — Z96.651 PRESENCE OF RIGHT ARTIFICIAL KNEE JOINT: ICD-10-CM

## 2017-09-08 DIAGNOSIS — Z87.820 PERSONAL HISTORY OF TRAUMATIC BRAIN INJURY: ICD-10-CM

## 2017-09-08 DIAGNOSIS — Z98.890 OTHER SPECIFIED POSTPROCEDURAL STATES: ICD-10-CM

## 2017-09-08 DIAGNOSIS — E78.5 HYPERLIPIDEMIA, UNSPECIFIED: ICD-10-CM

## 2017-09-08 DIAGNOSIS — N40.0 BENIGN PROSTATIC HYPERPLASIA WITHOUT LOWER URINARY TRACT SYMPTOMS: ICD-10-CM

## 2017-09-08 DIAGNOSIS — Z86.718 PERSONAL HISTORY OF OTHER VENOUS THROMBOSIS AND EMBOLISM: ICD-10-CM

## 2017-09-08 DIAGNOSIS — E11.9 TYPE 2 DIABETES MELLITUS WITHOUT COMPLICATIONS: ICD-10-CM

## 2017-09-18 PROCEDURE — 80048 BASIC METABOLIC PNL TOTAL CA: CPT

## 2017-09-18 PROCEDURE — 86850 RBC ANTIBODY SCREEN: CPT

## 2017-09-18 PROCEDURE — 93005 ELECTROCARDIOGRAM TRACING: CPT

## 2017-09-18 PROCEDURE — 86901 BLOOD TYPING SEROLOGIC RH(D): CPT

## 2017-09-18 PROCEDURE — 82550 ASSAY OF CK (CPK): CPT

## 2017-09-18 PROCEDURE — 36415 COLL VENOUS BLD VENIPUNCTURE: CPT

## 2017-09-18 PROCEDURE — 82330 ASSAY OF CALCIUM: CPT

## 2017-09-18 PROCEDURE — 97162 PT EVAL MOD COMPLEX 30 MIN: CPT

## 2017-09-18 PROCEDURE — C1713: CPT

## 2017-09-18 PROCEDURE — C1889: CPT

## 2017-09-18 PROCEDURE — 83036 HEMOGLOBIN GLYCOSYLATED A1C: CPT

## 2017-09-18 PROCEDURE — 97530 THERAPEUTIC ACTIVITIES: CPT

## 2017-09-18 PROCEDURE — 76000 FLUOROSCOPY <1 HR PHYS/QHP: CPT

## 2017-09-18 PROCEDURE — 84295 ASSAY OF SERUM SODIUM: CPT

## 2017-09-18 PROCEDURE — 85025 COMPLETE CBC W/AUTO DIFF WBC: CPT

## 2017-09-18 PROCEDURE — 84484 ASSAY OF TROPONIN QUANT: CPT

## 2017-09-18 PROCEDURE — 97161 PT EVAL LOW COMPLEX 20 MIN: CPT

## 2017-09-18 PROCEDURE — 86900 BLOOD TYPING SEROLOGIC ABO: CPT

## 2017-09-18 PROCEDURE — 97116 GAIT TRAINING THERAPY: CPT

## 2017-09-18 PROCEDURE — 85027 COMPLETE CBC AUTOMATED: CPT

## 2017-09-18 PROCEDURE — 84132 ASSAY OF SERUM POTASSIUM: CPT

## 2017-09-18 PROCEDURE — 85018 HEMOGLOBIN: CPT

## 2017-09-29 ENCOUNTER — APPOINTMENT (OUTPATIENT)
Dept: SPINE | Facility: CLINIC | Age: 62
End: 2017-09-29

## 2017-10-05 ENCOUNTER — FORM ENCOUNTER (OUTPATIENT)
Age: 62
End: 2017-10-05

## 2017-10-06 ENCOUNTER — APPOINTMENT (OUTPATIENT)
Dept: SPINE | Facility: CLINIC | Age: 62
End: 2017-10-06
Payer: COMMERCIAL

## 2017-10-06 ENCOUNTER — OUTPATIENT (OUTPATIENT)
Dept: OUTPATIENT SERVICES | Facility: HOSPITAL | Age: 62
LOS: 1 days | End: 2017-10-06
Payer: COMMERCIAL

## 2017-10-06 VITALS — HEART RATE: 76 BPM | TEMPERATURE: 97.6 F | SYSTOLIC BLOOD PRESSURE: 114 MMHG | DIASTOLIC BLOOD PRESSURE: 76 MMHG

## 2017-10-06 DIAGNOSIS — Z41.9 ENCOUNTER FOR PROCEDURE FOR PURPOSES OTHER THAN REMEDYING HEALTH STATE, UNSPECIFIED: Chronic | ICD-10-CM

## 2017-10-06 DIAGNOSIS — Z90.49 ACQUIRED ABSENCE OF OTHER SPECIFIED PARTS OF DIGESTIVE TRACT: Chronic | ICD-10-CM

## 2017-10-06 DIAGNOSIS — Z96.652 PRESENCE OF LEFT ARTIFICIAL KNEE JOINT: Chronic | ICD-10-CM

## 2017-10-06 PROCEDURE — 72040 X-RAY EXAM NECK SPINE 2-3 VW: CPT

## 2017-10-06 PROCEDURE — 99024 POSTOP FOLLOW-UP VISIT: CPT

## 2017-10-06 PROCEDURE — 72040 X-RAY EXAM NECK SPINE 2-3 VW: CPT | Mod: 26

## 2017-10-06 RX ORDER — RISPERIDONE 4 MG/1
0 TABLET ORAL
Qty: 0 | Refills: 0 | COMMUNITY

## 2017-10-06 RX ORDER — NAPROXEN 500 MG/1
500 TABLET, DELAYED RELEASE ORAL
Qty: 60 | Refills: 0 | Status: DISCONTINUED | COMMUNITY
Start: 2017-07-07

## 2017-10-06 RX ORDER — PROPRANOLOL HCL 160 MG
0 CAPSULE, EXTENDED RELEASE 24HR ORAL
Qty: 0 | Refills: 0 | COMMUNITY

## 2017-10-06 RX ORDER — DULOXETINE HYDROCHLORIDE 30 MG/1
1 CAPSULE, DELAYED RELEASE ORAL
Qty: 0 | Refills: 0 | COMMUNITY

## 2017-10-06 RX ORDER — ATORVASTATIN CALCIUM 80 MG/1
1 TABLET, FILM COATED ORAL
Qty: 0 | Refills: 0 | COMMUNITY

## 2017-10-06 RX ORDER — NAPROXEN 500 MG/1
500 TABLET ORAL
Refills: 0 | Status: DISCONTINUED | COMMUNITY
End: 2017-10-06

## 2017-10-06 RX ORDER — CYCLOBENZAPRINE HYDROCHLORIDE 10 MG/1
0 TABLET, FILM COATED ORAL
Qty: 0 | Refills: 0 | COMMUNITY

## 2017-10-06 RX ORDER — BETHANECHOL CHLORIDE 25 MG
1 TABLET ORAL
Qty: 0 | Refills: 0 | COMMUNITY

## 2017-10-06 RX ORDER — ZALEPLON 10 MG
20 CAPSULE ORAL
Qty: 0 | Refills: 0 | COMMUNITY

## 2017-10-06 RX ORDER — METFORMIN HYDROCHLORIDE 850 MG/1
1 TABLET ORAL
Qty: 0 | Refills: 0 | COMMUNITY

## 2017-10-06 RX ORDER — HYDROCODONE BITARTRATE AND ACETAMINOPHEN 10; 300 MG/1; MG/1
10-300 TABLET ORAL
Qty: 60 | Refills: 0 | Status: DISCONTINUED | COMMUNITY
Start: 2017-04-14

## 2017-10-06 RX ORDER — TAMSULOSIN HYDROCHLORIDE 0.4 MG/1
1 CAPSULE ORAL
Qty: 0 | Refills: 0 | COMMUNITY

## 2017-10-06 RX ORDER — CLONAZEPAM 1 MG
3 TABLET ORAL
Qty: 0 | Refills: 0 | COMMUNITY

## 2017-11-08 ENCOUNTER — APPOINTMENT (OUTPATIENT)
Dept: SPINE | Facility: CLINIC | Age: 62
End: 2017-11-08
Payer: COMMERCIAL

## 2017-11-08 VITALS
OXYGEN SATURATION: 98 % | WEIGHT: 223 LBS | BODY MASS INDEX: 31.22 KG/M2 | SYSTOLIC BLOOD PRESSURE: 142 MMHG | HEART RATE: 74 BPM | TEMPERATURE: 97.5 F | DIASTOLIC BLOOD PRESSURE: 87 MMHG | HEIGHT: 71 IN

## 2017-11-08 PROCEDURE — 99024 POSTOP FOLLOW-UP VISIT: CPT

## 2017-11-13 PROBLEM — F32.9 MAJOR DEPRESSIVE DISORDER, SINGLE EPISODE, UNSPECIFIED: Chronic | Status: ACTIVE | Noted: 2017-08-31

## 2017-11-13 PROBLEM — I10 ESSENTIAL (PRIMARY) HYPERTENSION: Chronic | Status: ACTIVE | Noted: 2017-08-31

## 2017-11-13 PROBLEM — E78.5 HYPERLIPIDEMIA, UNSPECIFIED: Chronic | Status: ACTIVE | Noted: 2017-08-31

## 2017-11-13 PROBLEM — G95.20 UNSPECIFIED CORD COMPRESSION: Chronic | Status: ACTIVE | Noted: 2017-08-31

## 2018-02-07 ENCOUNTER — APPOINTMENT (OUTPATIENT)
Dept: SPINE | Facility: CLINIC | Age: 63
End: 2018-02-07

## 2018-07-16 PROBLEM — I82.409 ACUTE EMBOLISM AND THROMBOSIS OF UNSPECIFIED DEEP VEINS OF UNSPECIFIED LOWER EXTREMITY: Chronic | Status: ACTIVE | Noted: 2017-09-01

## 2018-12-20 NOTE — PATIENT PROFILE ADULT. - PRO SERVICES AT [facility]
Take Tylenol as needed as directed for pain. Apply heat or ice to pain sites.  Continue attending physical therapy.   Follow-up with neurology.   Closely monitor your symptoms and return to the ED should you experience nausea or vomiting, unusual grogginess, confusion, vision changes, loss of consciousness, or any other concerning symptoms.     none

## 2021-06-09 PROBLEM — I21.3 ST ELEVATION (STEMI) MYOCARDIAL INFARCTION OF UNSPECIFIED SITE: Chronic | Status: ACTIVE | Noted: 2017-09-01

## 2021-06-09 PROBLEM — E11.9 TYPE 2 DIABETES MELLITUS WITHOUT COMPLICATIONS: Chronic | Status: ACTIVE | Noted: 2017-09-01

## 2021-06-17 ENCOUNTER — APPOINTMENT (OUTPATIENT)
Dept: NEUROLOGY | Facility: CLINIC | Age: 66
End: 2021-06-17
Payer: MEDICARE

## 2021-06-17 VITALS — HEART RATE: 66 BPM | DIASTOLIC BLOOD PRESSURE: 85 MMHG | SYSTOLIC BLOOD PRESSURE: 131 MMHG

## 2021-06-17 VITALS
WEIGHT: 184 LBS | DIASTOLIC BLOOD PRESSURE: 83 MMHG | HEART RATE: 88 BPM | SYSTOLIC BLOOD PRESSURE: 144 MMHG | BODY MASS INDEX: 28.88 KG/M2 | HEIGHT: 67 IN

## 2021-06-17 PROCEDURE — 99205 OFFICE O/P NEW HI 60 MIN: CPT

## 2021-06-17 PROCEDURE — 99442: CPT | Mod: NC,95

## 2021-06-17 RX ORDER — BETHANECHOL CHLORIDE 25 MG/1
25 TABLET ORAL
Refills: 0 | Status: DISCONTINUED | COMMUNITY
End: 2021-06-17

## 2021-06-17 RX ORDER — CYCLOBENZAPRINE HYDROCHLORIDE 10 MG/1
10 TABLET, FILM COATED ORAL
Refills: 0 | Status: DISCONTINUED | COMMUNITY
End: 2021-06-17

## 2021-06-17 RX ORDER — DULOXETINE HYDROCHLORIDE 60 MG/1
60 CAPSULE, DELAYED RELEASE PELLETS ORAL
Refills: 0 | Status: DISCONTINUED | COMMUNITY
End: 2021-06-17

## 2021-06-17 RX ORDER — METFORMIN HYDROCHLORIDE 1000 MG/1
1000 TABLET, COATED ORAL
Refills: 0 | Status: DISCONTINUED | COMMUNITY
End: 2021-06-17

## 2021-06-17 RX ORDER — HYDROCODONE BITARTRATE AND ACETAMINOPHEN 10; 325 MG/1; MG/1
10-325 TABLET ORAL
Refills: 0 | Status: DISCONTINUED | COMMUNITY
End: 2021-06-17

## 2021-06-17 RX ORDER — BUSPIRONE HYDROCHLORIDE 30 MG/1
30 TABLET ORAL
Refills: 0 | Status: DISCONTINUED | COMMUNITY
End: 2021-06-17

## 2021-06-17 RX ORDER — TAMSULOSIN HYDROCHLORIDE 0.4 MG/1
0.4 CAPSULE ORAL
Refills: 0 | Status: DISCONTINUED | COMMUNITY
End: 2021-06-17

## 2021-06-17 RX ORDER — ZALEPLON 10 MG/1
10 CAPSULE ORAL
Refills: 0 | Status: DISCONTINUED | COMMUNITY
End: 2021-06-17

## 2021-06-17 RX ORDER — RISPERIDONE 1 MG/1
1 TABLET, FILM COATED ORAL
Refills: 0 | Status: DISCONTINUED | COMMUNITY
End: 2021-06-17

## 2021-06-17 RX ORDER — CLONAZEPAM 1 MG/1
1 TABLET ORAL
Refills: 0 | Status: DISCONTINUED | COMMUNITY
End: 2021-06-17

## 2021-06-17 RX ORDER — OXYCODONE AND ACETAMINOPHEN 5; 325 MG/1; MG/1
5-325 TABLET ORAL EVERY 8 HOURS
Qty: 60 | Refills: 0 | Status: DISCONTINUED | COMMUNITY
Start: 2017-10-06 | End: 2021-06-17

## 2021-06-17 RX ORDER — PROPRANOLOL HYDROCHLORIDE 20 MG/1
20 TABLET ORAL
Refills: 0 | Status: DISCONTINUED | COMMUNITY
End: 2021-06-17

## 2021-06-17 RX ORDER — ATORVASTATIN CALCIUM 20 MG/1
20 TABLET, FILM COATED ORAL
Qty: 90 | Refills: 0 | Status: DISCONTINUED | COMMUNITY
Start: 2017-08-02 | End: 2021-06-17

## 2021-07-16 ENCOUNTER — APPOINTMENT (OUTPATIENT)
Dept: NEUROLOGY | Facility: CLINIC | Age: 66
End: 2021-07-16
Payer: MEDICARE

## 2021-07-16 PROCEDURE — 99215 OFFICE O/P EST HI 40 MIN: CPT

## 2021-08-11 ENCOUNTER — NON-APPOINTMENT (OUTPATIENT)
Age: 66
End: 2021-08-11

## 2021-08-16 NOTE — DISCHARGE NOTE ADULT - MEDICATION SUMMARY - MEDICATIONS TO CHANGE
I will SWITCH the dose or number of times a day I take the medications listed below when I get home from the hospital:    cyclobenzaprine 10 mg oral tablet  --  by mouth 2 times a day    HYDROcodone-acetaminophen 10 mg-300 mg oral tablet  -- 1 tab(s) by mouth every 6 hours, As Needed Posterior Auricular Interpolation Flap Text: A decision was made to reconstruct the defect utilizing an interpolation axial flap and a staged reconstruction.  A telfa template was made of the defect.  This telfa template was then used to outline the posterior auricular interpolation flap.  The donor area for the pedicle flap was then injected with anesthesia.  The flap was excised through the skin and subcutaneous tissue down to the layer of the underlying musculature.  The pedicle flap was carefully excised within this deep plane to maintain its blood supply.  The edges of the donor site were undermined.   The donor site was closed in a primary fashion.  The pedicle was then rotated into position and sutured.  Once the tube was sutured into place, adequate blood supply was confirmed with blanching and refill.  The pedicle was then wrapped with xeroform gauze and dressed appropriately with a telfa and gauze bandage to ensure continued blood supply and protect the attached pedicle.

## 2021-09-23 ENCOUNTER — APPOINTMENT (OUTPATIENT)
Dept: NEUROLOGY | Facility: CLINIC | Age: 66
End: 2021-09-23
Payer: MEDICARE

## 2021-09-23 VITALS — DIASTOLIC BLOOD PRESSURE: 83 MMHG | HEART RATE: 66 BPM | SYSTOLIC BLOOD PRESSURE: 136 MMHG

## 2021-09-23 VITALS
BODY MASS INDEX: 29.03 KG/M2 | HEIGHT: 67 IN | DIASTOLIC BLOOD PRESSURE: 84 MMHG | WEIGHT: 185 LBS | SYSTOLIC BLOOD PRESSURE: 147 MMHG | HEART RATE: 71 BPM

## 2021-09-23 PROCEDURE — 99215 OFFICE O/P EST HI 40 MIN: CPT

## 2022-01-13 ENCOUNTER — APPOINTMENT (OUTPATIENT)
Dept: NEUROLOGY | Facility: CLINIC | Age: 67
End: 2022-01-13

## 2022-01-18 ENCOUNTER — NON-APPOINTMENT (OUTPATIENT)
Age: 67
End: 2022-01-18

## 2022-02-24 ENCOUNTER — NON-APPOINTMENT (OUTPATIENT)
Age: 67
End: 2022-02-24

## 2022-06-18 NOTE — DISCHARGE NOTE ADULT - MEDICATION SUMMARY - MEDICATIONS TO STOP TAKING
Normal for race
I will STOP taking the medications listed below when I get home from the hospital:  None

## 2022-06-24 NOTE — ASSESSMENT
[FreeTextEntry1] : \par 67 yo RH man with sleep apnea, and  tremor that started in the L hand in early 2021 - late 2020. Of note, the patient's father had PD. \par He was initially diagnosed with ET, then he went for a second opinion at [place], and he was told he had Parkinsonism and dementia, and he had a PET scan which did not show any feature consistent with dementia. Interestingly the patient had no cognitive complaints, ands he functions at very high cognitive level.\par \par On initial examination, he had instead clear evidence of Parkinson's disease, L predominant, with some loss of motor agility in the L>R hand, resting and postural tremor in the L, and mild rigidity bilaterally, with cogwheeling in the L hand only. Pt started Azilect, and tolerated the medication well. \par \par Since last visit, he reports that he had very mild progression, with no change in gait and balance, but possibly with some more tremor in the L > R hand, which is both more frequent and persistent and slightly more pronounced in intensity. There is however no loss of dexterity, he remains fully independent.\par \par No significant non motor symptoms, with some occasional vivid dream, but no obvious RBD, with vivid dreams possibly related to nights when he us not using CPAP.\par \par He was diagnosed recently with Alpha-gal, syndrome, he had a skin reaction after eating red meat ([place] tick bite transmission of alpha-gal).\par \par He continues to walk and exercise.\par \par A/Plan:\par - Continue Azilect 1 mg QAM.\par - No need to start Dopaminergic treatment  at this time\par

## 2022-06-24 NOTE — HISTORY OF PRESENT ILLNESS
[Home] : at home, [unfilled] , at the time of the visit. [Medical Office: ([facility])___] : at the medical office located in  [Verbal consent obtained from patient] : the patient, [unfilled] [FreeTextEntry1] : \par This visit was provided via telehealth using real-time 2-way audio visual technology. The patient, SHANNAN KELLY, was located at home, [address], [place] [address] , at the time of the visit. \par The provider, NOHEMI WILSON, was located at the medical office located in [facility] at the time of the visit. The patient, SHANNAN KELLY and Provider participated in the telehealth encounter. \par Verbal consent for telehealth services was given on February 24, 2022 by the patient, SHANNAN KELLY. \par \par 65 yo RH man with sleep apnea, and  tremor that started in the L hand in early 2021 - late 2020. Of note, the patient's father had PD. \par He was initially diagnosed with ET, then he went for a second opinion at [place], and he was told he had Parkinsonism and dementia, and he had a PET scan which did not show any feature consistent with dementia. Interestingly the patient had no cognitive complaints, ands he functions at very high cognitive level.\par \par On initial examination, he had instead clear evidence of Parkinson's disease, L predominant, with some loss of motor agility in the L>R hand, resting and postural tremor in the L, and mild rigidity bilaterally, with cogwheeling in the L hand only. Pt started Azilect, and tolerated the medication well. \par \par Since last visit, he reports that he had very mild progression, with no change in gait and balance, but possibly with some more tremor in the L > R hand, which is both more frequent and persistent and slightly more pronounced in intensity. There is however no loss of dexterity, he remains fully independent.\par \par No significant non motor symptoms, with some occasional vivid dream, but no obvious RBD, with vivid dreams possibly related to nights when he us not using CPAP.\par \par He was diagnosed recently with Alpha-gal, syndrome, he had a skin reaction after eating red meat ([place] tick bite transmission of alpha-gal).\par \par He continues to walk and exercise.\par \par A/Plan:\par - Continue Azilect 1 mg QAM.\par - No need to start L-dopa at this time\par - Monitor OH related symptoms. Discussed importance of taking time when changing positions and increase hydration.

## 2022-06-24 NOTE — PHYSICAL EXAM
[FreeTextEntry1] : Video Exam:\par Patient with normal cognitive function, mild hypomimia with diminished blinking, no reduction of voice volume and no dysarthria.\par Mild intermittent resting and postural tremor is present in the left hand, and occasionally in the right.\par There is mild loss of motor dexterity, with some decrement at rapid sequential and rapid alternating movements, also slightly more evident on the left side. Unchanged from previous visit.\par Posture is mildly stooped, possibly however mildly improved since last observation.\par He can stand with arms folded, gait is agile, with no obvious shortening of gait stride and minimal asymmetry with left reduction. No trouble with turns, no freezing of gait. \par \par

## 2022-10-14 NOTE — PROGRESS NOTE ADULT - MINUTES
35
35
[place] Pregnancy And Lactation Text: This medication is not safe during pregnancy. It is unknown if this medication is excreted in breast milk.

## 2024-01-03 NOTE — OCCUPATIONAL THERAPY INITIAL EVALUATION ADULT - IMPAIRED TRANSFERS: BED/CHAIR, REHAB EVAL
pt aox3, " lt lower leg wound, pain several days " rt side paralysis, CVA , poor cap refill, FROM decreased strength/impaired balance/impaired coordination/pain
